# Patient Record
Sex: FEMALE | Race: WHITE | Employment: FULL TIME | URBAN - METROPOLITAN AREA
[De-identification: names, ages, dates, MRNs, and addresses within clinical notes are randomized per-mention and may not be internally consistent; named-entity substitution may affect disease eponyms.]

---

## 2017-01-13 ENCOUNTER — OFFICE VISIT (OUTPATIENT)
Dept: FAMILY MEDICINE CLINIC | Age: 27
End: 2017-01-13

## 2017-01-13 VITALS
HEART RATE: 63 BPM | HEIGHT: 64 IN | SYSTOLIC BLOOD PRESSURE: 109 MMHG | WEIGHT: 159 LBS | BODY MASS INDEX: 27.14 KG/M2 | DIASTOLIC BLOOD PRESSURE: 73 MMHG

## 2017-01-13 DIAGNOSIS — L50.9 HIVES: ICD-10-CM

## 2017-01-13 PROCEDURE — 99213 OFFICE O/P EST LOW 20 MIN: CPT | Performed by: FAMILY MEDICINE

## 2017-01-13 RX ORDER — MONTELUKAST SODIUM 10 MG/1
10 TABLET ORAL DAILY
Qty: 90 TABLET | Refills: 3 | Status: SHIPPED | OUTPATIENT
Start: 2017-01-13 | End: 2018-02-02 | Stop reason: SDUPTHER

## 2017-01-13 RX ORDER — MONTELUKAST SODIUM 10 MG/1
10 TABLET ORAL DAILY
Qty: 30 TABLET | Refills: 5 | Status: SHIPPED | OUTPATIENT
Start: 2017-01-13 | End: 2017-01-13 | Stop reason: SDUPTHER

## 2017-09-06 ENCOUNTER — OFFICE VISIT (OUTPATIENT)
Dept: FAMILY MEDICINE CLINIC | Age: 27
End: 2017-09-06

## 2017-09-06 VITALS
BODY MASS INDEX: 26.98 KG/M2 | SYSTOLIC BLOOD PRESSURE: 122 MMHG | WEIGHT: 158 LBS | DIASTOLIC BLOOD PRESSURE: 85 MMHG | HEART RATE: 65 BPM | HEIGHT: 64 IN

## 2017-09-06 DIAGNOSIS — Z00.00 WELL ADULT EXAM: Primary | ICD-10-CM

## 2017-09-06 DIAGNOSIS — Z11.1 SCREENING-PULMONARY TB: ICD-10-CM

## 2017-09-06 PROCEDURE — 99395 PREV VISIT EST AGE 18-39: CPT | Performed by: FAMILY MEDICINE

## 2017-09-06 PROCEDURE — 86580 TB INTRADERMAL TEST: CPT | Performed by: FAMILY MEDICINE

## 2017-09-08 ENCOUNTER — NURSE ONLY (OUTPATIENT)
Dept: FAMILY MEDICINE CLINIC | Age: 27
End: 2017-09-08

## 2017-09-08 DIAGNOSIS — Z11.1 ENCOUNTER FOR PPD SKIN TEST READING: Primary | ICD-10-CM

## 2017-09-08 LAB
INDURATION: NORMAL
TB SKIN TEST: NEGATIVE

## 2017-10-05 ENCOUNTER — TELEPHONE (OUTPATIENT)
Dept: FAMILY MEDICINE CLINIC | Age: 27
End: 2017-10-05

## 2017-10-05 DIAGNOSIS — Z11.59 SCREENING FOR VIRAL DISEASE: Primary | ICD-10-CM

## 2017-10-06 ENCOUNTER — NURSE ONLY (OUTPATIENT)
Dept: FAMILY MEDICINE CLINIC | Age: 27
End: 2017-10-06

## 2017-10-06 DIAGNOSIS — Z11.59 SCREENING FOR VIRAL DISEASE: ICD-10-CM

## 2017-10-08 LAB — VZV IGG SER QL IA: 354 IV

## 2018-02-02 DIAGNOSIS — L50.9 HIVES: ICD-10-CM

## 2018-02-02 RX ORDER — MONTELUKAST SODIUM 10 MG/1
10 TABLET ORAL DAILY
Qty: 90 TABLET | Refills: 3 | Status: SHIPPED | OUTPATIENT
Start: 2018-02-02 | End: 2018-02-05 | Stop reason: SDUPTHER

## 2018-02-05 RX ORDER — MONTELUKAST SODIUM 10 MG/1
10 TABLET ORAL DAILY
Qty: 90 TABLET | Refills: 3 | Status: SHIPPED | OUTPATIENT
Start: 2018-02-05 | End: 2020-02-19 | Stop reason: ALTCHOICE

## 2018-02-05 NOTE — TELEPHONE ENCOUNTER
[id number] [employer] called wanting to know if refill for pt's singulair was requested. They were unable to see if it was or not.

## 2018-02-09 ENCOUNTER — OFFICE VISIT (OUTPATIENT)
Dept: FAMILY MEDICINE CLINIC | Age: 28
End: 2018-02-09

## 2018-02-09 VITALS
RESPIRATION RATE: 16 BRPM | HEIGHT: 64 IN | BODY MASS INDEX: 25.78 KG/M2 | WEIGHT: 151 LBS | HEART RATE: 67 BPM | DIASTOLIC BLOOD PRESSURE: 85 MMHG | SYSTOLIC BLOOD PRESSURE: 131 MMHG

## 2018-02-09 DIAGNOSIS — F32.9 REACTIVE DEPRESSION: Primary | ICD-10-CM

## 2018-02-09 PROCEDURE — 99213 OFFICE O/P EST LOW 20 MIN: CPT | Performed by: FAMILY MEDICINE

## 2018-02-09 PROCEDURE — G0444 DEPRESSION SCREEN ANNUAL: HCPCS | Performed by: FAMILY MEDICINE

## 2018-02-09 RX ORDER — VENLAFAXINE HYDROCHLORIDE 37.5 MG/1
37.5 CAPSULE, EXTENDED RELEASE ORAL DAILY
Qty: 30 CAPSULE | Refills: 3 | Status: SHIPPED | OUTPATIENT
Start: 2018-02-09 | End: 2018-06-14 | Stop reason: SDUPTHER

## 2018-02-09 ASSESSMENT — PATIENT HEALTH QUESTIONNAIRE - PHQ9
9. THOUGHTS THAT YOU WOULD BE BETTER OFF DEAD, OR OF HURTING YOURSELF: 0
10. IF YOU CHECKED OFF ANY PROBLEMS, HOW DIFFICULT HAVE THESE PROBLEMS MADE IT FOR YOU TO DO YOUR WORK, TAKE CARE OF THINGS AT HOME, OR GET ALONG WITH OTHER PEOPLE: 1
1. LITTLE INTEREST OR PLEASURE IN DOING THINGS: 3
6. FEELING BAD ABOUT YOURSELF - OR THAT YOU ARE A FAILURE OR HAVE LET YOURSELF OR YOUR FAMILY DOWN: 2
5. POOR APPETITE OR OVEREATING: 0
8. MOVING OR SPEAKING SO SLOWLY THAT OTHER PEOPLE COULD HAVE NOTICED. OR THE OPPOSITE, BEING SO FIGETY OR RESTLESS THAT YOU HAVE BEEN MOVING AROUND A LOT MORE THAN USUAL: 2
4. FEELING TIRED OR HAVING LITTLE ENERGY: 1
3. TROUBLE FALLING OR STAYING ASLEEP: 1
SUM OF ALL RESPONSES TO PHQ QUESTIONS 1-9: 14
SUM OF ALL RESPONSES TO PHQ9 QUESTIONS 1 & 2: 6
2. FEELING DOWN, DEPRESSED OR HOPELESS: 3
7. TROUBLE CONCENTRATING ON THINGS, SUCH AS READING THE NEWSPAPER OR WATCHING TELEVISION: 2

## 2018-02-09 NOTE — Clinical Note
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 They are financially strapped and I suggested she get counseling and her first question was: How much does that cost? I don't know what you can do to help, but whatever you can would be appreciated.  Fran Temple

## 2018-02-09 NOTE — PATIENT INSTRUCTIONS
His Needs Her Needs  By Yoseph Seay His Needs Her Needs - [email or web address]  Ad · www.amazon.com/popular/items     Read Customer Reviews & Find Best [place]. Free 2-Day Shipping w/Amazon [employer].   Gifts for anyone · Shop our Deal of the Day · Shop Our Huge Selection

## 2018-03-09 ENCOUNTER — OFFICE VISIT (OUTPATIENT)
Dept: FAMILY MEDICINE CLINIC | Age: 28
End: 2018-03-09

## 2018-03-09 VITALS
HEIGHT: 64 IN | BODY MASS INDEX: 25.78 KG/M2 | SYSTOLIC BLOOD PRESSURE: 128 MMHG | WEIGHT: 151 LBS | DIASTOLIC BLOOD PRESSURE: 85 MMHG | HEART RATE: 71 BPM

## 2018-03-09 DIAGNOSIS — F32.9 REACTIVE DEPRESSION: Primary | ICD-10-CM

## 2018-03-09 PROCEDURE — 99213 OFFICE O/P EST LOW 20 MIN: CPT | Performed by: FAMILY MEDICINE

## 2018-03-09 RX ORDER — LEVONORGESTREL/ETHINYL ESTRADIOL AND ETHINYL ESTRADIOL 100-20(84)
KIT ORAL
Refills: 0 | Status: ON HOLD | COMMUNITY
Start: 2018-03-05 | End: 2022-08-26 | Stop reason: HOSPADM

## 2018-03-09 ASSESSMENT — ENCOUNTER SYMPTOMS
VOMITING: 0
GASTROINTESTINAL NEGATIVE: 1
ABDOMINAL PAIN: 0
SHORTNESS OF BREATH: 0
DIARRHEA: 0
COUGH: 0
NAUSEA: 0
CONSTIPATION: 0
RESPIRATORY NEGATIVE: 1

## 2018-03-09 NOTE — PROGRESS NOTES
Chief Complaint   Patient presents with    Depression         HPI:  Mark Lemos is a 28 y.o. (: 1990) here today   for f/u on depression. .      She is compliant with her  medication for depression. She reports these have been effective  Currently she has states that her symptoms have gotten a lot better. These are not affecting her ability to function at work or and at home. They are not having side effects of sedation, , sexual dysfunction, , nausea, , weight gain,  and  weight loss. Review of Systems   Constitutional: Negative. Negative for chills and fever. Respiratory: Negative. Negative for cough and shortness of breath. Cardiovascular: Negative. Negative for chest pain and palpitations. Gastrointestinal: Negative. Negative for abdominal pain, constipation, diarrhea, nausea and vomiting. Endocrine: Negative. Negative for polyuria. Genitourinary: Negative. Negative for dysuria, frequency and urgency.        Past Medical History:   Diagnosis Date    Chronic idiopathic urticaria     Depressive disorder, not elsewhere classified     Headache(784.0)     Migraine, unspecified, without mention of intractable migraine without mention of status migrainosus      Family History   Problem Relation Age of Onset    No Known Problems Mother     Other Father      Pulmonary Embolism    Other Sister      Addiciont     Social History     Social History    Marital status:      Spouse name: Nara Kendall    Number of children: 2    Years of education: N/A     Occupational History    Nursing- [employer]      Social History Main Topics    Smoking status: Former Smoker    Smokeless tobacco: Never Used    Alcohol use Yes      Comment: occasionally    Drug use: Unknown    Sexual activity: Not on file     Other Topics Concern    Not on file     Social History Narrative    No narrative on file       New Prescriptions    No medications on file         Meds Prior to visit:  Prior to

## 2018-03-21 ENCOUNTER — OFFICE VISIT (OUTPATIENT)
Dept: PSYCHOLOGY | Age: 28
End: 2018-03-21

## 2018-03-21 DIAGNOSIS — F32.9 REACTIVE DEPRESSION: Primary | ICD-10-CM

## 2018-03-21 PROCEDURE — 90791 PSYCH DIAGNOSTIC EVALUATION: CPT | Performed by: PSYCHOLOGIST

## 2018-03-21 ASSESSMENT — ANXIETY QUESTIONNAIRES
7. FEELING AFRAID AS IF SOMETHING AWFUL MIGHT HAPPEN: 0-NOT AT ALL SURE
3. WORRYING TOO MUCH ABOUT DIFFERENT THINGS: 1-SEVERAL DAYS
GAD7 TOTAL SCORE: 7
1. FEELING NERVOUS, ANXIOUS, OR ON EDGE: 1-SEVERAL DAYS
5. BEING SO RESTLESS THAT IT IS HARD TO SIT STILL: 1-SEVERAL DAYS
6. BECOMING EASILY ANNOYED OR IRRITABLE: 2-OVER HALF THE DAYS
4. TROUBLE RELAXING: 1-SEVERAL DAYS
2. NOT BEING ABLE TO STOP OR CONTROL WORRYING: 1-SEVERAL DAYS

## 2018-03-21 ASSESSMENT — PATIENT HEALTH QUESTIONNAIRE - PHQ9
1. LITTLE INTEREST OR PLEASURE IN DOING THINGS: 1
SUM OF ALL RESPONSES TO PHQ QUESTIONS 1-9: 2
SUM OF ALL RESPONSES TO PHQ9 QUESTIONS 1 & 2: 2
2. FEELING DOWN, DEPRESSED OR HOPELESS: 1

## 2018-03-21 NOTE — PROGRESS NOTES
Behavioral Health Consultation  Kristal June PsyD  Psychologist  3/21/2018  4:04 PM      Time spent with Patient: 50 minutes  This is patient's first  [facility] appointment. Reason for Consult:  Reactive depression  Referring Provider: Hiral Dickson MD  [address] [place] [address]    Pt provided informed consent for the behavioral health program. Discussed with patient model of service to include the limits of confidentiality (i.e. abuse reporting, suicide intervention, etc.) and short-term intervention focused approach. Pt indicated understanding. Feedback given to PCP. S:    Presenting Problem (PP): reactive depression    Problem hx: marital issues, been together 10 years, dated right after HS, 1-2 months got pregnant, stayed together, been  4 years, last 2 years been livign separately,    NP clinicals added to marital issues, full time [employer]     2 children: 5years old, 1years old     's alcohol problem: was drinking 12 beers and 1/3 bottle of run, down to 10 beer per night, pt unsure if he views this as a problem, feels he is cutting back because he is worried that she is going to leave him    Trauma hx:     Past psych tx: HS, sertraline, psychotherapy, every 2 weeks, 4 or 5 months, sophomore or osvaldo year, hard time expressing feelings, parents  at the time     Current psych med tx: What makes problem Better/Worse: Other problems/stressors:      Habits and health bx:   ETOH:  Tobacco:  Drugs:  Caffeine:    Functional assessment/Typical day (how PP is affecting or being affected by. Ameya Stone ):  Close relationships:    Recreation:   Physical activity:  Sleep:    Work:     Psych ROS:      Depression:      Linh: DENIES insomnia with increased energy, rapid speech, easily distracted or decreased attention, irritability, racing thoughts, expansive mood, increase in energy and goal directed behavior, grandiosity, flight of Other Topics Concern    Not on file     Social History Narrative    No narrative on file       TOBACCO:   reports that she has quit smoking. She has never used smokeless tobacco.  ETOH:   reports that she drinks alcohol. Family History:   Family History   Problem Relation Age of Onset    No Known Problems Mother     Other Father      Pulmonary Embolism    Other Sister      Addiciont         A:    Pt presenting with mixed anxiety and depressed mood in the adjustment disorder spectrum. Current stress seems to be situational in nature: severe marital issues,  has chronic alcohol problem for the last 10 years. Since pt has reached out to PCP for help with medication and she has shared this with , he is trying to cut back, down from 12 beers and 1/3 bottle of rum to 10 beers tonight. Pt is happy about this, but deeper discussion revealed that she is struggling with chronic and severe resentment about his drinking over the years. Recommended couple therapy to address marital issue but pt unsure if she wants to work on marriage. Gave her referral to [facility] for couple therapy, she will call them to find out availability but we agreed after much discussion another individual consult is needed before she talks to  about pursing treatment. Really encouraged her to take her time with this decision, medication is helping(see PHQ-2 and SHANTEL-7 scores below) and so we want to give her time to rest and recover from the stress of marriage. She was in agreement with this plan for now. F/u with me in 3 weeks, will continue to discuss readiness for couple consult and/or referral to couple therapy directly. Denied any si/hi risk, intent, or plan. No hx psych admissions. No hx suicide attempts/gestures. No hx substance about tx.      PHQ Scores 3/21/2018 2/9/2018   PHQ2 Score 2 6   PHQ9 Score 2 14     Interpretation of Total Score Depression Severity: 1-4 = Minimal depression, 5-9 = Mild depression, 10-14 = Moderate depression, 15-19 = Moderately severe depression, 20-27 = Severe depression    Trouble sleeping and feeling tried. SHANTEL 7 SCORE 3/21/2018   SHANTEL-7 Total Score 7     Interpretation of SHANTEL-7 score: 5-9 = mild anxiety, 10-14 = moderate anxiety, 15+ = severe anxiety. Recommend referral to behavioral health for scores 10 or greater. Safety:   Risk factors:   Protective factors:  does not have lethal plan, does not have access to guns or weapons, patient is darwin for safety, no family h/o suicide, no active psychosis or cognitive dysfunction, compliant with recommended medications,  and patient is future oriented    Diagnosis:    Adjustment disorder with mixed anxiety and depressed mood      Diagnosis Date    Chronic idiopathic urticaria     Depressive disorder, not elsewhere classified     Headache(784.0)     Migraine, unspecified, without mention of intractable migraine without mention of status migrainosus      Problems with primary support group      Plan:  Pt interventions:    Discussed self-care (sleep, nutrition, rewarding activities, social support, exercise), Discussed benefits of referral for specialty care, Discussed and problem-solved barriers in adhering to behavioral change plan, Discussed potential barriers to change, Established rapport, Conducted functional assessment, Supportive techniques and Provided Psychoeducation re: benefits of couple therapy. Pt Behavioral Change Plan:    1. Consider Psych [facility] for individual and/or couple therapy referral: In [place]: [phone]  2. Continue with venlafaxine 37.5 mg, return to clinic for Dr. Genevie Meckel in 3 months  3.  Return to clinic for Dr. Sindi Jacob in 3 weeks on 4/11 at 2:30 pm

## 2018-03-22 DIAGNOSIS — Z83.2 FAMILY HISTORY OF PROTEIN S DEFICIENCY: Primary | ICD-10-CM

## 2018-03-22 DIAGNOSIS — M54.2 NECK PAIN: Primary | ICD-10-CM

## 2018-03-26 ENCOUNTER — HOSPITAL ENCOUNTER (OUTPATIENT)
Dept: PHYSICAL THERAPY | Age: 28
Discharge: HOME | End: 2018-03-31
Admitting: ORTHOPAEDIC SURGERY

## 2018-03-27 NOTE — PLAN OF CARE
function, and ADLs as indicated by Functional Deficits. Long Term Goals: To be achieved in: 4 weeks  1. Disability index score of 70% or more on FOTO to assist with reaching prior level of function. 2. Patient will demonstrate increased AROM to [facility] of cervical/thoracic spine to allow for proper joint functioning as indicated by patients Functional Deficits. 3. Patient will demonstrate an increase in postural awareness and control and activation of  Deep cervical stabilizers to allow for proper functional mobility as indicated by patients Functional Deficits. 4. Patient will return to reading for 2 hours functional activities without increased symptoms or restriction.    5. No pain with driving and looking to left(patient specific functional goal)       Electronically signed by:  Rodriguez Bro PT

## 2018-03-27 NOTE — FLOWSHEET NOTE
manip      Rib mobilizations      STM      DN            NMR re-education      T-spine Ext- foam roll      Chin tucks                                   Therapeutic Exercise and NMR EXR  [x] (51565) Provided verbal/tactile cueing for activities related to strengthening, flexibility, endurance, ROM  for improvements in cervical, postural, scapular, scapulothoracic and UE control with self care, reaching, carrying, lifting, house/yardwork, driving/computer work.    [] (81709) Provided verbal/tactile cueing for activities related to improving balance, coordination, kinesthetic sense, posture, motor skill, proprioception  to assist with cervical, scapular, scapulothoracic and UE control with self care, reaching, carrying, lifting, house/yardwork, driving/computer work. Therapeutic Activities:    [x] (26251 or 38607) Provided verbal/tactile cueing for activities related to improving balance, coordination, kinesthetic sense, posture, motor skill, proprioception and motor activation to allow for proper function of cervical, scapular, scapulothoracic and UE control with self care, carrying, lifting, driving/computer work.      Home Exercise Program:    [x] (99445) Reviewed/Progressed HEP activities related to strengthening, flexibility, endurance, ROM of cervical, scapular, scapulothoracic and UE control with self care, reaching, carrying, lifting, house/yardwork, driving/computer work  [] (49189) Reviewed/Progressed HEP activities related to improving balance, coordination, kinesthetic sense, posture, motor skill, proprioception of cervical, scapular, scapulothoracic and UE control with self care, reaching, carrying, lifting, house/yardwork, driving/computer work      Manual Treatments:  PROM / STM / Oscillations-Mobs:  G-I, II, III, IV (PA's, Inf., Post.)  [x] (50563) Provided manual therapy to mobilize soft tissue/joints of cervical/CT, scapular GHJ and UE for the purpose of decreasing headache, modulating pain, is slowed due to complexities listed. [] Progression has been slowed due to co-morbidities.   [x] Plan just implemented, too soon to assess goals progression  [] Other:     ASSESSMENT:  See eval    Treatment/Activity Tolerance:  [x] Patient tolerated treatment well [] Patient limited by fatique  [] Patient limited by pain  [] Patient limited by other medical complications  [] Other:     Prognosis: [x] Good [] Fair  [] Poor    Patient Requires Follow-up: [x] Yes  [] No    PLAN: See eval  [] Continue per plan of care [] Alter current plan (see comments)  [x] Plan of care initiated [] Hold pending MD visit [] Discharge    Electronically signed by: Rodriguez Bro PT

## 2018-04-01 ENCOUNTER — HOSPITAL ENCOUNTER (OUTPATIENT)
Dept: PHYSICAL THERAPY | Age: 28
Discharge: HOME | End: 2018-04-30
Attending: ORTHOPAEDIC SURGERY | Admitting: ORTHOPAEDIC SURGERY

## 2018-04-11 ENCOUNTER — OFFICE VISIT (OUTPATIENT)
Dept: PSYCHOLOGY | Age: 28
End: 2018-04-11

## 2018-04-11 DIAGNOSIS — F32.9 REACTIVE DEPRESSION: Primary | ICD-10-CM

## 2018-04-11 PROCEDURE — 90832 PSYTX W PT 30 MINUTES: CPT | Performed by: PSYCHOLOGIST

## 2018-04-11 ASSESSMENT — PATIENT HEALTH QUESTIONNAIRE - PHQ9
2. FEELING DOWN, DEPRESSED OR HOPELESS: 1
SUM OF ALL RESPONSES TO PHQ QUESTIONS 1-9: 2
1. LITTLE INTEREST OR PLEASURE IN DOING THINGS: 1
SUM OF ALL RESPONSES TO PHQ9 QUESTIONS 1 & 2: 2

## 2018-06-14 ENCOUNTER — TELEPHONE (OUTPATIENT)
Dept: FAMILY MEDICINE CLINIC | Age: 28
End: 2018-06-14

## 2018-06-14 DIAGNOSIS — F32.9 REACTIVE DEPRESSION: ICD-10-CM

## 2018-06-14 RX ORDER — VENLAFAXINE HYDROCHLORIDE 37.5 MG/1
37.5 CAPSULE, EXTENDED RELEASE ORAL DAILY
Qty: 90 CAPSULE | Refills: 3 | Status: SHIPPED | OUTPATIENT
Start: 2018-06-14 | End: 2020-02-19 | Stop reason: ALTCHOICE

## 2018-06-22 ENCOUNTER — OFFICE VISIT (OUTPATIENT)
Dept: FAMILY MEDICINE CLINIC | Age: 28
End: 2018-06-22

## 2018-06-22 VITALS
SYSTOLIC BLOOD PRESSURE: 108 MMHG | WEIGHT: 161 LBS | HEART RATE: 71 BPM | HEIGHT: 65 IN | DIASTOLIC BLOOD PRESSURE: 72 MMHG | BODY MASS INDEX: 26.82 KG/M2

## 2018-06-22 DIAGNOSIS — Z13.220 SCREENING CHOLESTEROL LEVEL: ICD-10-CM

## 2018-06-22 DIAGNOSIS — G43.809 OTHER MIGRAINE WITHOUT STATUS MIGRAINOSUS, NOT INTRACTABLE: ICD-10-CM

## 2018-06-22 DIAGNOSIS — Z00.00 WELL ADULT EXAM: Primary | ICD-10-CM

## 2018-06-22 LAB
A/G RATIO: 1.6 (ref 1.1–2.2)
ALBUMIN SERPL-MCNC: 4.2 G/DL (ref 3.4–5)
ALP BLD-CCNC: 39 U/L (ref 40–129)
ALT SERPL-CCNC: 21 U/L (ref 10–40)
ANION GAP SERPL CALCULATED.3IONS-SCNC: 19 MMOL/L (ref 3–16)
AST SERPL-CCNC: 37 U/L (ref 15–37)
BILIRUB SERPL-MCNC: 0.5 MG/DL (ref 0–1)
BUN BLDV-MCNC: 14 MG/DL (ref 7–20)
CALCIUM SERPL-MCNC: 9 MG/DL (ref 8.3–10.6)
CHLORIDE BLD-SCNC: 102 MMOL/L (ref 99–110)
CHOLESTEROL, TOTAL: 205 MG/DL (ref 0–199)
CO2: 21 MMOL/L (ref 21–32)
CREAT SERPL-MCNC: 0.6 MG/DL (ref 0.6–1.1)
GFR AFRICAN AMERICAN: >60
GFR NON-AFRICAN AMERICAN: >60
GLOBULIN: 2.6 G/DL
GLUCOSE BLD-MCNC: 88 MG/DL (ref 70–99)
HDLC SERPL-MCNC: 83 MG/DL (ref 40–60)
LDL CHOLESTEROL CALCULATED: 98 MG/DL
POTASSIUM SERPL-SCNC: 4.4 MMOL/L (ref 3.5–5.1)
SODIUM BLD-SCNC: 142 MMOL/L (ref 136–145)
TOTAL PROTEIN: 6.8 G/DL (ref 6.4–8.2)
TRIGL SERPL-MCNC: 121 MG/DL (ref 0–150)
VLDLC SERPL CALC-MCNC: 24 MG/DL

## 2018-06-22 PROCEDURE — 99395 PREV VISIT EST AGE 18-39: CPT | Performed by: FAMILY MEDICINE

## 2018-06-22 PROCEDURE — 36415 COLL VENOUS BLD VENIPUNCTURE: CPT | Performed by: FAMILY MEDICINE

## 2018-06-22 RX ORDER — SUMATRIPTAN 100 MG/1
TABLET, FILM COATED ORAL
Qty: 9 TABLET | Refills: 0 | Status: SHIPPED | OUTPATIENT
Start: 2018-06-22 | End: 2018-09-11 | Stop reason: SDUPTHER

## 2018-06-22 ASSESSMENT — ENCOUNTER SYMPTOMS
SHORTNESS OF BREATH: 0
ABDOMINAL PAIN: 0
COUGH: 0
CONSTIPATION: 0
SORE THROAT: 0
NAUSEA: 0
EYE PAIN: 0
RHINORRHEA: 0
DIARRHEA: 0
COLOR CHANGE: 0
VOMITING: 0

## 2018-09-11 DIAGNOSIS — G43.809 OTHER MIGRAINE WITHOUT STATUS MIGRAINOSUS, NOT INTRACTABLE: ICD-10-CM

## 2018-09-11 RX ORDER — SUMATRIPTAN 100 MG/1
TABLET, FILM COATED ORAL
Qty: 9 TABLET | Refills: 3 | Status: SHIPPED | OUTPATIENT
Start: 2018-09-11 | End: 2018-10-15 | Stop reason: SDUPTHER

## 2018-10-15 DIAGNOSIS — G43.809 OTHER MIGRAINE WITHOUT STATUS MIGRAINOSUS, NOT INTRACTABLE: ICD-10-CM

## 2018-10-15 RX ORDER — SUMATRIPTAN 100 MG/1
TABLET, FILM COATED ORAL
Qty: 9 TABLET | Refills: 0 | Status: SHIPPED | OUTPATIENT
Start: 2018-10-15 | End: 2018-12-18 | Stop reason: SDUPTHER

## 2018-11-02 ENCOUNTER — OFFICE VISIT (OUTPATIENT)
Dept: FAMILY MEDICINE CLINIC | Age: 28
End: 2018-11-02
Payer: COMMERCIAL

## 2018-11-02 VITALS
WEIGHT: 162 LBS | DIASTOLIC BLOOD PRESSURE: 84 MMHG | HEART RATE: 81 BPM | BODY MASS INDEX: 26.99 KG/M2 | SYSTOLIC BLOOD PRESSURE: 120 MMHG | HEIGHT: 65 IN

## 2018-11-02 DIAGNOSIS — M54.32 SCIATICA OF LEFT SIDE: Primary | ICD-10-CM

## 2018-11-02 PROCEDURE — 99213 OFFICE O/P EST LOW 20 MIN: CPT | Performed by: FAMILY MEDICINE

## 2018-11-02 RX ORDER — METHYLPREDNISOLONE 4 MG/1
TABLET ORAL
Qty: 1 KIT | Refills: 0 | Status: SHIPPED | OUTPATIENT
Start: 2018-11-02 | End: 2020-02-19 | Stop reason: ALTCHOICE

## 2018-11-02 RX ORDER — CYCLOBENZAPRINE HCL 10 MG
10 TABLET ORAL 3 TIMES DAILY PRN
Qty: 20 TABLET | Refills: 0 | Status: SHIPPED | OUTPATIENT
Start: 2018-11-02 | End: 2018-11-12

## 2018-11-02 ASSESSMENT — ENCOUNTER SYMPTOMS
DIARRHEA: 0
BACK PAIN: 1
VOMITING: 0
SHORTNESS OF BREATH: 0
COUGH: 0
ABDOMINAL PAIN: 0
CONSTIPATION: 0
NAUSEA: 0

## 2018-11-02 NOTE — PROGRESS NOTES
normal. She has no wheezes. She has no rales. Abdominal: Soft. Bowel sounds are normal. She exhibits no distension and no mass. There is no tenderness. Neurological:   Straight Leg Raise: positive left   Deep Tendon reflexes:    Patellar 2+  bilaterally     Achilles  0  left 2+ Right  Babinski:    Not assessed   Motor:  Proximally  5 bilaterally               Distally 5 bilaterally      Skin: Skin is warm and dry. No rash noted. Microscopic Examination (no units)   Date Value   02/22/2017 YES     LDL Calculated (mg/dL)   Date Value   06/22/2018 98       ASSESSMENT/PLAN:    1. Sciatica of left side  Counseled likely has impinged nerve root. Will tx with meds and HEP and recheck 1month sooner if wors. - cyclobenzaprine (FLEXERIL) 10 MG tablet; Take 1 tablet by mouth 3 times daily as needed for Muscle spasms best if taken one hour before bedtime then Ice your lower back for 20 min  Dispense: 20 tablet; Refill: 0  - methylPREDNISolone (MEDROL DOSEPACK) 4 MG tablet; Take by mouth. Dispense: 1 kit; Refill: 0      RTC 1 month. Scribe attestation:  Yolanda De La O MA, am scribing for and in the presence of Albert Shetty MD. Electronically signed by Tamara Maxwell MA on 11/2/2018 at 10:21 AM            Provider attestation: Sean Starkey MD, personally performed the services scribed by the user listed above in my presence, and it is both accurate and complete. I agree with the ROS and Past Histories independently gathered by the clinical support staff and the remaining scribed note accurately describes my personal service to the patient.     [facility]    11/2/2018  10:32 AM

## 2018-12-18 DIAGNOSIS — G43.809 OTHER MIGRAINE WITHOUT STATUS MIGRAINOSUS, NOT INTRACTABLE: ICD-10-CM

## 2018-12-18 RX ORDER — SUMATRIPTAN 100 MG/1
TABLET, FILM COATED ORAL
Qty: 9 TABLET | Refills: 0 | Status: SHIPPED | OUTPATIENT
Start: 2018-12-18 | End: 2019-09-09 | Stop reason: SDUPTHER

## 2019-09-09 DIAGNOSIS — G43.809 OTHER MIGRAINE WITHOUT STATUS MIGRAINOSUS, NOT INTRACTABLE: ICD-10-CM

## 2019-09-09 RX ORDER — SUMATRIPTAN 100 MG/1
TABLET, FILM COATED ORAL
Qty: 9 TABLET | Refills: 0 | Status: SHIPPED | OUTPATIENT
Start: 2019-09-09 | End: 2020-03-02

## 2019-09-09 RX ORDER — ONDANSETRON 4 MG/1
4 TABLET, FILM COATED ORAL DAILY PRN
Qty: 30 TABLET | Refills: 0 | Status: SHIPPED | OUTPATIENT
Start: 2019-09-09 | End: 2020-03-02

## 2019-12-20 RX ORDER — BENZONATATE 200 MG/1
200 CAPSULE ORAL 3 TIMES DAILY PRN
Qty: 30 CAPSULE | Refills: 0 | Status: SHIPPED | OUTPATIENT
Start: 2019-12-20 | End: 2019-12-27

## 2020-02-18 NOTE — PROGRESS NOTES
Surgical Breast Oncology     CC: High Risk for Breast Cancer      Nic Atkinson is a self-referral  for a consultation for high risk breast.    HPI:  Nic Atkinson is a 26 y.o.  woman here for evaluation of her risk for breast cancer. Overall doing well and has no breast related concerns or changes in her recent health. She states that she does occassioanlly perform routine self breast evaluations but not monthly. She has not noticed any new abnormalities such as masses, skin changes, color changes,nipple discharge, or changes to the nipple-areolar complex. Her family cancer history is significant for ovarian and breast cancer in her maternal grandmother in addition to breast cancer in her paternal grandmother. She has not a breast biopsy in the past.  She has never had a mammogram or breast MRI. Had 8-9 gene genetic testing at her gynecologist office, reports results negative. Has two girls, 11 and 5yo. 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      Age first live birth 24  Breastfeeding Yes for 6 months   premenopausal   Oral contraceptives Yes  Hormone replacement No      Past Medical History:   Diagnosis Date    Chronic idiopathic urticaria     Depressive disorder, not elsewhere classified     Headache(784.0)     Migraine, unspecified, without mention of intractable migraine without mention of status migrainosus        Past Surgical History:   Procedure Laterality Date    MOUTH SURGERY      OTHER SURGICAL HISTORY      2 Vaginal births       Family History   Problem Relation Age of Onset    No Known Problems Mother     Other Father         Pulmonary Embolism    High Cholesterol Father     Other Sister         Addiciont    Breast Cancer Maternal Grandmother         66's    Ovarian Cancer Maternal Grandmother         63's    Breast Cancer Paternal Grandmother         early 66's     Family History Significant for Cancer: Paternal Grandmother, breast cancer (possibly bilateral?), dx 68,  80  Maternal Grandmother, breast cancer, dx 68, ovarian cancer dx62,  80    Allergies as of 2020    (No Known Allergies)       Social History     Tobacco Use    Smoking status: Former Smoker    Smokeless tobacco: Never Used   Substance Use Topics    Alcohol use: Yes     Comment: occasionally    Drug use: No         Current Outpatient Medications:     SUMAtriptan (IMITREX) 100 MG tablet, TAKE 1 TABLET BY MOUTH AT ONSET OF HEADACHE. MAY REPEAT DOSE IN 2 HOURS IF NO RELIEF. DO NOT EXCEED 2 TABLETS IN 24 HOURS, Disp: 9 tablet, Rfl: 0    AMETHIA LO 0.1-0.02 & 0.01 MG TABS, TK 1 T PO QD, Disp: , Rfl: 0    ranitidine (ZANTAC 75) 75 MG tablet, Take 75 mg by mouth 2 times daily, Disp: , Rfl:     cetirizine (ZYRTEC ALLERGY) 10 MG tablet, Take 1 tablet by mouth daily as needed for Allergies (hives), Disp: 30 tablet, Rfl: 5    ondansetron (ZOFRAN) 4 MG tablet, Take 1 tablet by mouth daily as needed for Nausea or Vomiting (Patient not taking: Reported on 2020), Disp: 30 tablet, Rfl: 0      Medications: documentation has been reviewed in the electronic medical record and patient office intake form. REVIEW OF SYSTEMS:  Constitutional: Negative for fever  HENT: Negative for sore throat  Eyes: Negative for redness   Respiratory: Negative for dyspnea, cough  Cardiovascular: Negative for chest pain  Gastrointestinal: Negative for vomiting, diarrhea   Genitourinary: Negative for hematuria   Musculoskeletal: Negative for arthralgias   Skin: Negative for rash  Neurological: Negative for syncope  Hematological: Negative for adenopathy  Psychiatric/Behavorial: Negative for anxiety    PHYSICAL EXAM:  /70   Pulse 70   Ht 5' 4.5\" (1.638 m)   Wt 162 lb (73.5 kg)   SpO2 98%   BMI 27.38 kg/m²   Constitutional: She appearswell-nourished. No apparent distress. Breast: The patient was examined in the upright and supine position. She has a \"D\" cup breast. Breasts are symmetrically ptotic. Right: No new masses or changes in breast contour. No skin changes of the breast or nipple areolar complex. No nipple inversion or discharge. No erythema, thickening (peau d'orange), or dimpling. Left: No new masses or changes in breast contour. No skin changes of the breast or nipple areolar complex. No nipple inversion or discharge. No erythema, thickening (peau d'orange), or dimpling. There is no axillary lymphadenopathy palpated bilaterally. Head: Normocephalic and atraumatic  Eyes: EOM are normal. Pupils are equal, round, and reactive to light. Neck: Neck supple. No tracheal deviation present. No obvious mass. Cardiovascular: regular rate. Pulmonary: No accessory muscle use. Respirations non-labored and no wheezing. Lymphatics: No palpable supraclavicular, cervical, or axillary lymphadenopathy  Skin: No rash noted. No erythema. Neurologic: alert and oriented. Extremities: appear well perfused. No edema. No joint deformity         Risk assessment using CARI Breast Cancer Risk Evaluation Tool to evaluate her risk compared to the general population. Her ten year risk of breast cancer is 0.9% (general population average 0.5%). Her lifetime risk for breast cancer is 20.2% (general population average 3.3%). ASSESSMENT:  - High Risk for Breast Cancer based on increased risk profile for breast cancer. Sheila (CARI) 8.0 Model calculated risk at  - 20.2% today. - Screening Breast Examination   - Family History of Ovarian Cancer - maternal grandmother   - Family History of Breast Cancer - maternal grandmother and paternal grandmother     PLAN:    1. Surveillance: We discussed the NCCN guidelines for high risk surveillance.   This includes annual screening mammography beginning 10 years prior to youngest affected family member (but not before age 27), annual screening MRI beginning 10 years prior to youngest

## 2020-02-19 ENCOUNTER — OFFICE VISIT (OUTPATIENT)
Dept: SURGERY | Age: 30
End: 2020-02-19
Payer: COMMERCIAL

## 2020-02-19 VITALS
HEIGHT: 65 IN | SYSTOLIC BLOOD PRESSURE: 115 MMHG | DIASTOLIC BLOOD PRESSURE: 70 MMHG | HEART RATE: 70 BPM | OXYGEN SATURATION: 98 % | BODY MASS INDEX: 26.99 KG/M2 | WEIGHT: 162 LBS

## 2020-02-19 PROCEDURE — 99243 OFF/OP CNSLTJ NEW/EST LOW 30: CPT | Performed by: NURSE PRACTITIONER

## 2020-02-20 ENCOUNTER — HOSPITAL ENCOUNTER (OUTPATIENT)
Dept: MAMMOGRAPHY | Age: 30
Discharge: HOME | End: 2020-02-20
Payer: COMMERCIAL

## 2020-02-20 PROCEDURE — 77063 BREAST TOMOSYNTHESIS BI: CPT

## 2020-03-02 ENCOUNTER — OFFICE VISIT (OUTPATIENT)
Dept: INTERNAL MEDICINE CLINIC | Age: 30
End: 2020-03-02
Payer: COMMERCIAL

## 2020-03-02 VITALS
WEIGHT: 166 LBS | HEIGHT: 64 IN | HEART RATE: 73 BPM | DIASTOLIC BLOOD PRESSURE: 74 MMHG | BODY MASS INDEX: 28.34 KG/M2 | SYSTOLIC BLOOD PRESSURE: 122 MMHG

## 2020-03-02 PROCEDURE — 99385 PREV VISIT NEW AGE 18-39: CPT | Performed by: NURSE PRACTITIONER

## 2020-03-02 RX ORDER — FAMOTIDINE 10 MG
10 TABLET ORAL 2 TIMES DAILY
Status: ON HOLD | COMMUNITY
End: 2022-08-26 | Stop reason: HOSPADM

## 2020-03-02 ASSESSMENT — ENCOUNTER SYMPTOMS
WHEEZING: 0
COUGH: 0
CHEST TIGHTNESS: 0
SHORTNESS OF BREATH: 0

## 2020-03-02 NOTE — PROGRESS NOTES
3/2/2020    This is a 26 y.o. female   Chief Complaint   Patient presents with   Froylan Meyer     HPI     Patient is here to establish care. She needs to be well within blood work. 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 She sees GYN for her Pap and birth control. She is  with 2 children. In a new relationship. History of migraines - doing well on current medication. She denies any concerns today. She has a family history of breast and ovarian cancer - she is seeing breast specialist for imaging.       Past Medical History:   Diagnosis Date    Chronic idiopathic urticaria     Depressive disorder, not elsewhere classified     Headache(784.0)     Migraine, unspecified, without mention of intractable migraine without mention of status migrainosus      Past Surgical History:   Procedure Laterality Date    MOUTH SURGERY  2003    OTHER SURGICAL HISTORY  2008/2014    2 Vaginal births     Social History     Socioeconomic History    Marital status:      Spouse name: William Ta    Number of children: 2    Years of education: Not on file    Highest education level: Not on file   Occupational History    Occupation: [address] [place] [address],[address] Financial resource strain: Not on file    Food insecurity:     Worry: Not on file     Inability: Not on file   Zhui Xin needs:     Medical: Not on file     Non-medical: Not on file   Tobacco Use    Smoking status: Former Smoker    Smokeless tobacco: Never Used   Substance and Sexual Activity    Alcohol use: Yes     Comment: occasionally    Drug use: No    Sexual activity: Yes     Birth control/protection: Other-see comments   Lifestyle    Physical activity:     Days per week: Not on file     Minutes per session: Not on file    Stress: Not on file   Relationships    Social connections:     Talks on phone: Not on file     Gets together: Not on file     Attends Alevism service: Not on file Active member of club or organization: Not on file     Attends meetings of clubs or organizations: Not on file     Relationship status: Not on file    Intimate partner violence:     Fear of current or ex partner: Not on file     Emotionally abused: Not on file     Physically abused: Not on file     Forced sexual activity: Not on file   Other Topics Concern    Not on file   Social History Narrative    Not on file     Family History   Problem Relation Age of Onset    No Known Problems Mother     Other Father         Pulmonary Embolism    High Cholesterol Father     Other Sister         Addiciont    Breast Cancer Maternal Grandmother         66's    Ovarian Cancer Maternal Grandmother         63's    Breast Cancer Paternal Grandmother         early 66's     Current Outpatient Medications   Medication Sig Dispense Refill    Ubrogepant (UBRELVY) 50 MG TABS Take by mouth      famotidine (PEPCID AC) 10 MG tablet Take 10 mg by mouth 2 times daily      AMETHIA LO 0.1-0.02 & 0.01 MG TABS TK 1 T PO QD  0    cetirizine (ZYRTEC ALLERGY) 10 MG tablet Take 1 tablet by mouth daily as needed for Allergies (hives) 30 tablet 5     No current facility-administered medications for this visit.       No Known Allergies    Office Visit on 06/22/2018   Component Date Value Ref Range Status    Cholesterol, Total 06/22/2018 205* 0 - 199 mg/dL Final    Triglycerides 06/22/2018 121  0 - 150 mg/dL Final    HDL 06/22/2018 83* 40 - 60 mg/dL Final    LDL Calculated 06/22/2018 98  <100 mg/dL Final    VLDL Cholesterol Calculated 06/22/2018 24  Not Established mg/dL Final    Sodium 06/22/2018 142  136 - 145 mmol/L Final    Potassium 06/22/2018 4.4  3.5 - 5.1 mmol/L Final    Chloride 06/22/2018 102  99 - 110 mmol/L Final    CO2 06/22/2018 21  21 - 32 mmol/L Final    Anion Gap 06/22/2018 19* 3 - 16 Final    Glucose 06/22/2018 88  70 - 99 mg/dL Final    BUN 06/22/2018 14  7 - 20 mg/dL Final    CREATININE 06/22/2018 0.6  0.6 - 1.1 mg/dL Final    GFR Non- 06/22/2018 >60  >60 Final    Comment: >60 mL/min/1.73m2 EGFR, calc. for ages 25 and older using the  MDRD formula (not corrected for weight), is valid for stable  renal function.  GFR  06/22/2018 >60  >60 Final    Comment: Chronic Kidney Disease: less than 60 ml/min/1.73 sq.m. Kidney Failure: less than 15 ml/min/1.73 sq.m. Results valid for patients 18 years and older.  Calcium 06/22/2018 9.0  8.3 - 10.6 mg/dL Final    Total Protein 06/22/2018 6.8  6.4 - 8.2 g/dL Final    Alb 06/22/2018 4.2  3.4 - 5.0 g/dL Final    Albumin/Globulin Ratio 06/22/2018 1.6  1.1 - 2.2 Final    Total Bilirubin 06/22/2018 0.5  0.0 - 1.0 mg/dL Final    Alkaline Phosphatase 06/22/2018 39* 40 - 129 U/L Final    ALT 06/22/2018 21  10 - 40 U/L Final    AST 06/22/2018 37  15 - 37 U/L Final    Comment: Specimen hemolysis has exceeded the interference as defined by Roche. Value may be falsely increased. Suggest recollection if clinically  indicated.  Globulin 06/22/2018 2.6  g/dL Final     Review of Systems   Constitutional: Negative for chills, fatigue and fever. Respiratory: Negative for cough, chest tightness, shortness of breath and wheezing. Cardiovascular: Negative for chest pain, palpitations and leg swelling. Neurological: Negative for dizziness, tremors, light-headedness and headaches. /74   Pulse 73   Ht 5' 4\" (1.626 m)   Wt 166 lb (75.3 kg)   LMP 02/02/2020 (Exact Date)   BMI 28.49 kg/m²      Physical Exam  Vitals signs reviewed. Constitutional:       General: She is not in acute distress. Appearance: She is well-developed. She is not diaphoretic. HENT:      Head: Normocephalic and atraumatic. Neck:      Musculoskeletal: Normal range of motion and neck supple. Thyroid: No thyromegaly. Trachea: No tracheal deviation. Cardiovascular:      Rate and Rhythm: Normal rate and regular rhythm.       Heart sounds: Normal heart sounds. No murmur. Pulmonary:      Effort: Pulmonary effort is normal. No respiratory distress. Breath sounds: Normal breath sounds. No wheezing or rhonchi. Skin:     General: Skin is warm and dry. Neurological:      General: No focal deficit present. Mental Status: She is alert and oriented to person, place, and time. Deep Tendon Reflexes: Reflexes are normal and symmetric. Psychiatric:         Mood and Affect: Mood normal.         Behavior: Behavior normal.        Diagnosis  Assessment and Plan  1. Preventative health care  Overall patient is feeling well  Checking labs for B12 within  Had waist circumference  - Basic Metabolic Panel; Future  - Lipid Panel; Future  - Vitamin D 25 Hydroxy; Future  - TSH WITH REFLEX TO FT4; Future  - NICOTINE AND METABOLITES Serum/Plasma; Future    2. Other migraine without status migrainosus, not intractable  Stable, controlled on current regimen. 3. Family history of breast cancer  Recently had mammogram- wnl  Plan to have mammogram once a year and MRI once a year  Seeing breast specialist    4.  Family history of ovarian cancer  See #3    Follow-up yearly and as needed    Electronically signed by OMI Nicolas CNP on 3/2/2020 at 3:45 PM

## 2020-03-05 RX ORDER — ERGOCALCIFEROL 1.25 MG/1
50000 CAPSULE ORAL WEEKLY
Qty: 12 CAPSULE | Refills: 1 | Status: ON HOLD | OUTPATIENT
Start: 2020-03-05 | End: 2022-08-26 | Stop reason: HOSPADM

## 2020-05-20 RX ORDER — UBROGEPANT 50 MG/1
1 TABLET ORAL DAILY PRN
Qty: 30 TABLET | Refills: 1 | Status: SHIPPED | OUTPATIENT
Start: 2020-05-20 | End: 2021-03-01 | Stop reason: SDUPTHER

## 2020-06-01 ENCOUNTER — HOSPITAL ENCOUNTER (OUTPATIENT)
Dept: MRI IMAGING | Age: 30
Discharge: HOME | End: 2020-06-01
Payer: COMMERCIAL

## 2020-06-01 PROCEDURE — 6360000004 HC RX CONTRAST MEDICATION: Performed by: NURSE PRACTITIONER

## 2020-06-01 PROCEDURE — A9579 GAD-BASE MR CONTRAST NOS,1ML: HCPCS | Performed by: NURSE PRACTITIONER

## 2020-06-01 PROCEDURE — 77049 MRI BREAST C-+ W/CAD BI: CPT

## 2020-06-01 RX ADMIN — GADOTERIDOL 15 ML: 279.3 INJECTION, SOLUTION INTRAVENOUS at 15:30

## 2020-06-03 ENCOUNTER — TELEPHONE (OUTPATIENT)
Dept: INTERNAL MEDICINE CLINIC | Age: 30
End: 2020-06-03

## 2020-06-03 NOTE — TELEPHONE ENCOUNTER
I can send over imitrix if she needs it however pt does not tolerate it well. I thought we were doing an appeal? Do we know where we are in the appeal process?

## 2020-06-08 RX ORDER — SUMATRIPTAN 100 MG/1
TABLET, FILM COATED ORAL
Qty: 9 TABLET | Refills: 0 | Status: SHIPPED | OUTPATIENT
Start: 2020-06-08 | End: 2020-07-13

## 2020-07-06 ENCOUNTER — TELEPHONE (OUTPATIENT)
Dept: SURGERY | Age: 30
End: 2020-07-06

## 2020-07-06 NOTE — TELEPHONE ENCOUNTER
Left message on voice mail, rescheduled appointment from 08/18/20 to 08/17/20 due to Keo Vásquez not in office, Schedule has changed due to COVID-19.

## 2020-07-13 RX ORDER — SUMATRIPTAN 100 MG/1
TABLET, FILM COATED ORAL
Qty: 9 TABLET | Refills: 0 | Status: SHIPPED | OUTPATIENT
Start: 2020-07-13 | End: 2020-08-21

## 2020-07-27 RX ORDER — NITROFURANTOIN 25; 75 MG/1; MG/1
100 CAPSULE ORAL 2 TIMES DAILY
Qty: 14 CAPSULE | Refills: 0 | Status: SHIPPED | OUTPATIENT
Start: 2020-07-27 | End: 2020-08-03

## 2020-08-05 ENCOUNTER — E-VISIT (OUTPATIENT)
Dept: INTERNAL MEDICINE CLINIC | Age: 30
End: 2020-08-05
Payer: COMMERCIAL

## 2020-08-05 PROCEDURE — 99422 OL DIG E/M SVC 11-20 MIN: CPT | Performed by: NURSE PRACTITIONER

## 2020-08-06 RX ORDER — TOPIRAMATE 50 MG/1
100 TABLET, FILM COATED ORAL 2 TIMES DAILY
Qty: 360 TABLET | Refills: 1 | Status: SHIPPED | OUTPATIENT
Start: 2020-08-06 | End: 2020-09-14 | Stop reason: SDUPTHER

## 2020-08-06 NOTE — PROGRESS NOTES
Restarting topamax for better migraine control. Sent Rx to pharmacy. Follow up if not having good control.

## 2020-08-21 RX ORDER — SUMATRIPTAN 100 MG/1
TABLET, FILM COATED ORAL
Qty: 9 TABLET | Refills: 0 | Status: SHIPPED | OUTPATIENT
Start: 2020-08-21 | End: 2020-10-14 | Stop reason: SDUPTHER

## 2020-08-24 ENCOUNTER — OFFICE VISIT (OUTPATIENT)
Dept: SURGERY | Age: 30
End: 2020-08-24
Payer: COMMERCIAL

## 2020-08-24 VITALS
HEART RATE: 64 BPM | SYSTOLIC BLOOD PRESSURE: 112 MMHG | BODY MASS INDEX: 27.04 KG/M2 | HEIGHT: 64 IN | DIASTOLIC BLOOD PRESSURE: 72 MMHG | TEMPERATURE: 97.4 F | WEIGHT: 158.4 LBS

## 2020-08-24 PROCEDURE — 99213 OFFICE O/P EST LOW 20 MIN: CPT | Performed by: NURSE PRACTITIONER

## 2020-08-24 NOTE — PROGRESS NOTES
Surgical Breast Oncology     CC: High Risk for Breast Cancer      HPI:  Luis Travis is a 31 y.o.  woman here for routine follow up for high risk for breast cancer. Overall doing well and has no breast related concerns or changes in her recent health. She states that she does occassioanlly perform routine self breast evaluations but not monthly. She has not noticed any new abnormalities such as masses, skin changes, color changes, nipple discharge, or changes to the nipple-areolar complex. Her family cancer history is significant for ovarian and breast cancer in her maternal grandmother in addition to breast cancer in her paternal grandmother. She has not had a breast biopsy in the past.      Had 8-9 gene genetic testing at her gynecologist office, reports results negative. Has two girls, 11 and 7yo. 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 BI-RADS 1. Bilateral screening MRI 2020:  Negative MRI of the breast.  BI-RADS 1. Past Medical History:   Diagnosis Date    Chronic idiopathic urticaria     Depressive disorder, not elsewhere classified     Headache(784.0)     Migraine, unspecified, without mention of intractable migraine without mention of status migrainosus        Past Surgical History:   Procedure Laterality Date    MOUTH SURGERY      OTHER SURGICAL HISTORY      2 Vaginal births     Menstrual History:  Menarche age 5.      Age first live birth 24  Breastfeeding Yes for 6 months   premenopausal   Oral contraceptives Yes  Hormone replacement No     Family History   Problem Relation Age of Onset    No Known Problems Mother     Other Father         Pulmonary Embolism    High Cholesterol Father     Other Sister         Addiciont    Breast Cancer Maternal Grandmother         66's    Ovarian Cancer Maternal Grandmother Negative for anxiety    PHYSICAL EXAM:  /72 (Site: Left Upper Arm, Position: Sitting, Cuff Size: Medium Adult)   Pulse 64   Temp 97.4 °F (36.3 °C) (Tympanic)   Ht 5' 4\" (1.626 m)   Wt 158 lb 6.4 oz (71.8 kg)   BMI 27.19 kg/m²   Constitutional: She appearswell-nourished. No apparent distress. Breast: The patient was examined in the upright and supine position. She has a \"D\" cup breast. Breasts are symmetrically ptotic. Right: No new masses or changes in breast contour. No skin changes of the breast or nipple areolar complex. No nipple inversion or discharge. No erythema, thickening (peau d'orange), or dimpling. Left: No new masses or changes in breast contour. No skin changes of the breast or nipple areolar complex. No nipple inversion or discharge. No erythema, thickening (peau d'orange), or dimpling. There is no axillary lymphadenopathy palpated bilaterally. Head: Normocephalic and atraumatic  Eyes: EOM are normal. Pupils are equal, round, and reactive to light. Neck: Neck supple. No tracheal deviation present. No obvious mass. Cardiovascular: regular rate. Pulmonary: No accessory muscle use. Respirations non-labored and no wheezing. Lymphatics: No palpable supraclavicular, cervical, or axillary lymphadenopathy  Skin: No rash noted. No erythema. Neurologic: alert and oriented. Extremities: appear well perfused. No edema. No joint deformity         Risk assessment using CARI Breast Cancer Risk Evaluation Tool to evaluate her risk compared to the general population. Her ten year risk of breast cancer is 0.9% (general population average 0.5%). Her lifetime risk for breast cancer is 20.2% (general population average 3.3%). ASSESSMENT:  - High Risk for Breast Cancer based on increased risk profile for breast cancer. Tyrtommy-Desiree (CARI) 8.0 Model calculated risk at  - 20.2% today.     - Screening Breast Examination   - Family History of Ovarian Cancer - maternal grandmother   - Family History of Breast Cancer - maternal grandmother and paternal grandmother     PLAN:    1. Surveillance: We discussed the NCCN guidelines for high risk surveillance. This includes annual screening mammography beginning 10 years prior to youngest affected family member (but not before age [id number] Yordan Blake), annual screening MRI beginning 10 years prior to youngest affected family member (but not before age 22), and clinical breast exams every 6-12 months. We also stressed the importance of breast awareness. - Bilateral screening mammogram and clinical breast exam due: 2/2021   - Bilateral breast MRI and clinical breast exam due: 8/2021     2. Risk Reduction Surgery:  Has been briefly discussed in the past.      3. Medical Oncology: We discussed the role of chemoprophylaxis in women with an increased risk of breast cancer. At this time Ms. Jordyn Alva expresses that she is not interested in antiestrogen therapy. We will refer her to medical oncology should she wish to revisit this in the future. She will consider and we can revisit at next appt. 4. Requested to bring genetic testing results to office      5. Education provided for Healthy Lifestyle Recommendations: healthy diet, routine exercise, weight control, decreased alcohol consumption. She is exercising 3 days/week. OMI Ram[facility])   Surgical Breast Oncology   [phone]      All of the patient's questions were answered at this time however, she was encouraged to call the office with any further inquiries. Approximately 15 minutes of time were spent in this visit of which 50% or more of the time was related to coordination of care.

## 2020-09-04 ENCOUNTER — TELEPHONE (OUTPATIENT)
Dept: INTERNAL MEDICINE CLINIC | Age: 30
End: 2020-09-04

## 2020-09-04 RX ORDER — SULFAMETHOXAZOLE AND TRIMETHOPRIM 800; 160 MG/1; MG/1
1 TABLET ORAL 2 TIMES DAILY
Qty: 14 TABLET | Refills: 0 | Status: SHIPPED | OUTPATIENT
Start: 2020-09-04 | End: 2020-09-11

## 2020-09-14 RX ORDER — TOPIRAMATE 50 MG/1
100 TABLET, FILM COATED ORAL 2 TIMES DAILY
Qty: 360 TABLET | Refills: 1 | Status: SHIPPED | OUTPATIENT
Start: 2020-09-14 | End: 2021-10-25

## 2020-10-14 RX ORDER — SUMATRIPTAN 100 MG/1
TABLET, FILM COATED ORAL
Qty: 9 TABLET | Refills: 0 | Status: SHIPPED | OUTPATIENT
Start: 2020-10-14 | End: 2020-12-16 | Stop reason: SDUPTHER

## 2020-10-14 NOTE — TELEPHONE ENCOUNTER
[facility] is requesting a refill on Sumatriptan 100 mg tabs. Med pended.       Last OV 8/5/2020   Next OV Visit date not found  Last [facility] 8/21/2020

## 2020-11-16 ENCOUNTER — TELEPHONE (OUTPATIENT)
Dept: INTERNAL MEDICINE CLINIC | Age: 30
End: 2020-11-16

## 2020-12-16 RX ORDER — SUMATRIPTAN 100 MG/1
TABLET, FILM COATED ORAL
Qty: 9 TABLET | Refills: 0 | Status: ON HOLD | OUTPATIENT
Start: 2020-12-16 | End: 2022-08-26 | Stop reason: HOSPADM

## 2020-12-16 NOTE — TELEPHONE ENCOUNTER
[facility] is requesting a refill on SUMAtriptan (IMITREX) 100 MG tablet    Med pended.     Last OV 8/5/2020   Next OV Visit date not found  Last [facility] 10/14/2020

## 2021-03-01 RX ORDER — UBROGEPANT 50 MG/1
1 TABLET ORAL DAILY PRN
Qty: 30 TABLET | Refills: 1 | Status: SHIPPED | OUTPATIENT
Start: 2021-03-01 | End: 2021-07-23

## 2021-03-12 ENCOUNTER — HOSPITAL ENCOUNTER (OUTPATIENT)
Dept: MAMMOGRAPHY | Age: 31
Discharge: HOME | End: 2021-03-12
Payer: COMMERCIAL

## 2021-03-12 ENCOUNTER — OFFICE VISIT (OUTPATIENT)
Dept: SURGERY | Age: 31
End: 2021-03-12
Payer: COMMERCIAL

## 2021-03-12 VITALS
SYSTOLIC BLOOD PRESSURE: 112 MMHG | BODY MASS INDEX: 26.42 KG/M2 | RESPIRATION RATE: 18 BRPM | HEART RATE: 78 BPM | HEIGHT: 65 IN | OXYGEN SATURATION: 99 % | TEMPERATURE: 97.2 F | WEIGHT: 158.6 LBS | DIASTOLIC BLOOD PRESSURE: 70 MMHG

## 2021-03-12 DIAGNOSIS — R92.2 DENSE BREAST TISSUE ON MAMMOGRAM: ICD-10-CM

## 2021-03-12 DIAGNOSIS — Z12.39 ENCOUNTER FOR SCREENING BREAST EXAMINATION: ICD-10-CM

## 2021-03-12 DIAGNOSIS — Z12.31 VISIT FOR SCREENING MAMMOGRAM: ICD-10-CM

## 2021-03-12 DIAGNOSIS — Z91.89 AT HIGH RISK FOR BREAST CANCER: Primary | ICD-10-CM

## 2021-03-12 DIAGNOSIS — Z12.39 BREAST CANCER SCREENING, HIGH RISK PATIENT: ICD-10-CM

## 2021-03-12 DIAGNOSIS — Z80.3 FAMILY HISTORY OF BREAST CANCER: ICD-10-CM

## 2021-03-12 DIAGNOSIS — Z80.41 FAMILY HISTORY OF OVARIAN CANCER: ICD-10-CM

## 2021-03-12 PROCEDURE — 77067 SCR MAMMO BI INCL CAD: CPT

## 2021-03-12 PROCEDURE — 99213 OFFICE O/P EST LOW 20 MIN: CPT | Performed by: NURSE PRACTITIONER

## 2021-03-12 NOTE — PATIENT INSTRUCTIONS
Healthy Lifestyle Recommendations: healthy diet (decrease consumption of red meat, increase fresh fruits and vegetables), decreased alcohol consumption (less than 4 drinks/week), adequate sleep (goal 6-8 hours), routine exercise (goal 150 minutes/week or greater), weight control. Patient Education        Breast Self-Exam: Care Instructions  Your Care Instructions     A breast self-exam is when you check your breasts for lumps or changes. This regular exam helps you learn how your breasts normally look and feel. Most breast problems or changes are not because of cancer. Breast self-exam is not a substitute for a mammogram. Having regular breast exams by your doctor and regular mammograms improve your chances of finding any problems with your breasts. Some women set a time each month to do a step-by-step breast self-exam. Other women like a less formal system. They might look at their breasts as they brush their teeth, or feel their breasts once in a while in the shower. If you notice a change in your breast, tell your doctor. Follow-up care is a key part of your treatment and safety. Be sure to make and go to all appointments, and call your doctor if you are having problems. It's also a good idea to know your test results and keep a list of the medicines you take. How do you do a breast self-exam?  · The best time to examine your breasts is usually one week after your menstrual period begins. Your breasts should not be tender then. If you do not have periods, you might do your exam on a day of the month that is easy to remember. · To examine your breasts:  ? Remove all your clothes above the waist and lie down. When you are lying down, your breast tissue spreads evenly over your chest wall, which makes it easier to feel all your breast tissue. ?  Use the pads--not the fingertips--of the 3 middle fingers of your left hand to check your right breast. Move your fingers slowly in small coin-sized circles that overlap. ? Use three levels of pressure to feel of all your breast tissue. Use light pressure to feel the tissue close to the skin surface. Use medium pressure to feel a little deeper. Use firm pressure to feel your tissue close to your breastbone and ribs. Use each pressure level to feel your breast tissue before moving on to the next spot. ? Check your entire breast, moving up and down as if following a strip from the collarbone to the bra line, and from the armpit to the ribs. Repeat until you have covered the entire breast.  ? Repeat this procedure for your left breast, using the pads of the 3 middle fingers of your right hand. · To examine your breasts while in the shower:  ? Place one arm over your head and lightly soap your breast on that side. ? Using the pads of your fingers, gently move your hand over your breast (in the strip pattern described above), feeling carefully for any lumps or changes. ? Repeat for the other breast.  · Have your doctor inspect anything you notice to see if you need further testing. Where can you learn more? Go to https://[employer]Surveypal. org and sign in to your [employer] account. Enter P148 in the [employer] box to learn more about \"Breast Self-Exam: Care Instructions. \"     If you do not have an account, please click on the \"Sign Up Now\" link. Current as of: December 17, 2020               Content Version: 12.8  © 1836-8686 Healthwise, Incorporated. Care instructions adapted under license by [facility] ([facility]). If you have questions about a medical condition or this instruction, always ask your healthcare professional. [place] [address] any warranty or liability for your use of this information.

## 2021-07-23 RX ORDER — UBROGEPANT 50 MG/1
TABLET ORAL
Qty: 10 TABLET | Refills: 5 | Status: ON HOLD | OUTPATIENT
Start: 2021-07-23 | End: 2022-08-26 | Stop reason: HOSPADM

## 2021-08-05 LAB
CHOLESTEROL, TOTAL: 236 MG/DL (ref 0–199)
GLUCOSE BLD-MCNC: 82 MG/DL (ref 70–99)
HDLC SERPL-MCNC: 65 MG/DL (ref 40–60)
LDL CHOLESTEROL CALCULATED: 137 MG/DL
TRIGL SERPL-MCNC: 168 MG/DL (ref 0–150)

## 2021-08-07 ENCOUNTER — TELEPHONE (OUTPATIENT)
Dept: INTERNAL MEDICINE CLINIC | Age: 31
End: 2021-08-07

## 2021-08-07 RX ORDER — TRIAMCINOLONE ACETONIDE 0.25 MG/G
CREAM TOPICAL
Status: CANCELLED | OUTPATIENT
Start: 2021-08-07

## 2021-08-26 ENCOUNTER — OFFICE VISIT (OUTPATIENT)
Dept: INTERNAL MEDICINE CLINIC | Age: 31
End: 2021-08-26
Payer: COMMERCIAL

## 2021-08-26 VITALS
SYSTOLIC BLOOD PRESSURE: 116 MMHG | HEART RATE: 62 BPM | BODY MASS INDEX: 28.05 KG/M2 | OXYGEN SATURATION: 99 % | DIASTOLIC BLOOD PRESSURE: 80 MMHG | WEIGHT: 166 LBS

## 2021-08-26 DIAGNOSIS — G43.809 OTHER MIGRAINE WITHOUT STATUS MIGRAINOSUS, NOT INTRACTABLE: ICD-10-CM

## 2021-08-26 DIAGNOSIS — F90.0 ATTENTION DEFICIT HYPERACTIVITY DISORDER (ADHD), PREDOMINANTLY INATTENTIVE TYPE: Primary | ICD-10-CM

## 2021-08-26 PROCEDURE — 99214 OFFICE O/P EST MOD 30 MIN: CPT | Performed by: NURSE PRACTITIONER

## 2021-08-26 RX ORDER — DEXTROAMPHETAMINE SACCHARATE, AMPHETAMINE ASPARTATE MONOHYDRATE, DEXTROAMPHETAMINE SULFATE AND AMPHETAMINE SULFATE 3.75; 3.75; 3.75; 3.75 MG/1; MG/1; MG/1; MG/1
15 CAPSULE, EXTENDED RELEASE ORAL DAILY
Qty: 30 CAPSULE | Refills: 0 | Status: SHIPPED | OUTPATIENT
Start: 2021-08-26 | End: 2021-09-24 | Stop reason: SDUPTHER

## 2021-08-26 SDOH — ECONOMIC STABILITY: FOOD INSECURITY: WITHIN THE PAST 12 MONTHS, YOU WORRIED THAT YOUR FOOD WOULD RUN OUT BEFORE YOU GOT MONEY TO BUY MORE.: NEVER TRUE

## 2021-08-26 SDOH — ECONOMIC STABILITY: FOOD INSECURITY: WITHIN THE PAST 12 MONTHS, THE FOOD YOU BOUGHT JUST DIDN'T LAST AND YOU DIDN'T HAVE MONEY TO GET MORE.: NEVER TRUE

## 2021-08-26 ASSESSMENT — ENCOUNTER SYMPTOMS
CHEST TIGHTNESS: 0
WHEEZING: 0
SHORTNESS OF BREATH: 0
COUGH: 0

## 2021-08-26 ASSESSMENT — SOCIAL DETERMINANTS OF HEALTH (SDOH): HOW HARD IS IT FOR YOU TO PAY FOR THE VERY BASICS LIKE FOOD, HOUSING, MEDICAL CARE, AND HEATING?: NOT HARD AT ALL

## 2021-08-26 NOTE — PROGRESS NOTES
8/26/21     Chief Complaint   Patient presents with    ADHD     Would like to be treated, never dx. HPI     Here for follow up. Recently completed be well within labs. Migraines - moderately controlled  ubrelvy works better than imitrex   On topamax - not taking consistently     Concern for ADHD- having lots of trouble focusing   Cannot get anything done. She is unorganized  Getting worse worse. Has always had symptoms but was able to manage  Having difficulty with work  Sometimes compensates with caffeine   Does not feel it is related to any anxiety   Self assessment complete today - will scan into media    No Known Allergies    Current Outpatient Medications   Medication Sig Dispense Refill    amphetamine-dextroamphetamine (ADDERALL XR) 15 MG extended release capsule Take 1 capsule by mouth daily for 30 days. 30 capsule 0    UBRELVY 50 MG TABS TAKE 1 TABLET BY MOUTH AS NEEDED FOR MIGRAINE. MAY REPEAT IN 2 HOURS. 10 tablet 5    SUMAtriptan (IMITREX) 100 MG tablet TAKE 1 TABLET BY MOUTH AT ONSET OF HEADACHE. MAY REPEAT DOSE IN 2 HOURS IF NO RELIEF. DO NOT EXCEED 2 TABLETS IN 24 HOURS 9 tablet 0    topiramate (TOPAMAX) 50 MG tablet Take 2 tablets by mouth 2 times daily 360 tablet 1    vitamin D (ERGOCALCIFEROL) 1.25 MG (36385 UT) CAPS capsule Take 1 capsule by mouth once a week 12 capsule 1    famotidine (PEPCID AC) 10 MG tablet Take 10 mg by mouth 2 times daily      AMETHIA LO 0.1-0.02 & 0.01 MG TABS TK 1 T PO QD  0    cetirizine (ZYRTEC ALLERGY) 10 MG tablet Take 1 tablet by mouth daily as needed for Allergies (hives) 30 tablet 5     No current facility-administered medications for this visit. Review of Systems   Constitutional: Negative for chills, fatigue and fever. Respiratory: Negative for cough, chest tightness, shortness of breath and wheezing. Cardiovascular: Negative for chest pain, palpitations and leg swelling.    Neurological: Negative for dizziness, tremors, light-headedness

## 2021-09-19 NOTE — PROGRESS NOTES
Surgical Breast Oncology     CC: High Risk for Breast Cancer      HPI:  Dolores Pradhan is a 31 y.o.  woman here for routine follow up for high risk for breast cancer. Overall doing well and has no breast related concerns or changes in her recent health. Has had right lower outer quadrant pain, throbbing, occurred for one week on 2 occassions and then resolved; contributes it to her menstrual cycle. She states that she does occassioanlly perform routine self breast evaluations but not monthly. She has not noticed any new abnormalities such as masses, skin changes, color changes, nipple discharge, or changes to the nipple-areolar complex. Her family cancer history is significant for ovarian and breast cancer in her maternal grandmother in addition to breast cancer in her paternal grandmother. She has not had a breast biopsy in the past.      Had 8-9 gene genetic testing at her gynecologist office, reports results negative. Has two girls, 11 and 5yo. 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 BI-RADS 1. Bilateral screening MRI 2020:  Negative MRI of the breast.  BI-RADS 1. Bilateral screening mammogram 3/12/2021:  Breast stroma is heterogeneously dense. No new suspicious or concerning findings suggestive of malignancy. BI-RADS 2. Past Medical History:   Diagnosis Date    Chronic idiopathic urticaria     Depressive disorder, not elsewhere classified     Headache(784.0)     Migraine, unspecified, without mention of intractable migraine without mention of status migrainosus        Past Surgical History:   Procedure Laterality Date    MOUTH SURGERY      OTHER SURGICAL HISTORY      2 Vaginal births     Menstrual History:  Menarche age 5.      Age first live birth 24  Breastfeeding Yes for 6 months   premenopausal   Oral contraceptives dyspnea, cough  Cardiovascular: Negative for chest pain  Gastrointestinal: Negative for vomiting, diarrhea   Genitourinary: Negative for hematuria   Musculoskeletal: Negative for arthralgias   Skin: Negative for rash  Neurological: Negative for syncope  Hematological: Negative for adenopathy  Psychiatric/Behavorial: Negative for anxiety    PHYSICAL EXAM:  /70 (Site: Right Upper Arm, Position: Sitting, Cuff Size: Medium Adult)   Pulse 78   Temp 97.2 °F (36.2 °C) (Temporal)   Resp 18   Ht 5' 4.5\" (1.638 m)   Wt 158 lb 9.6 oz (71.9 kg)   SpO2 99%   BMI 26.80 kg/m²   Constitutional: She appearswell-nourished. No apparent distress. Breast: The patient was examined in the upright and supine position. She has a \"D\" cup breast. Breasts are symmetrically ptotic. Right: No new masses or changes in breast contour. No skin changes of the breast or nipple areolar complex. No nipple inversion or discharge. No erythema, thickening (peau d'orange), or dimpling. Left: No new masses or changes in breast contour. No skin changes of the breast or nipple areolar complex. No nipple inversion or discharge. No erythema, thickening (peau d'orange), or dimpling. There is no axillary lymphadenopathy palpated bilaterally. Head: Normocephalic and atraumatic  Eyes: EOM are normal. Pupils are equal, round, and reactive to light. Neck: Neck supple. No tracheal deviation present. No obvious mass. Cardiovascular: regular rate. Pulmonary: No accessory muscle use. Respirations non-labored and no wheezing. Lymphatics: No palpable supraclavicular, cervical, or axillary lymphadenopathy  Skin: No rash noted. No erythema. Neurologic: alert and oriented. Extremities: appear well perfused. No edema. No joint deformity         Risk assessment using CARI Breast Cancer Risk Evaluation Tool to evaluate her risk compared to the general population.   Her ten year risk of breast cancer is 0.9% (general population average 0.5%). Her lifetime risk for breast cancer is 20.2% (general population average 3.3%). ASSESSMENT:  - High Risk for Breast Cancer based on increased risk profile for breast cancer. Tyrer-Artiezick (CARI) 8.0 Model calculated risk at  - 20.2% today. - Screening Breast Examination   - Breast Pain - likely cyclic   - Dense breast tissue   - Family History of Ovarian Cancer - maternal grandmother   - Family History of Breast Cancer - maternal grandmother and paternal grandmother     PLAN:    1. Surveillance: We discussed the NCCN guidelines for high risk surveillance. This includes annual screening mammography, annual screening MRI, and clinical breast exams every 6-12 months. We also stressed the importance of breast awareness. - Bilateral screening mammogram and clinical breast exam due: 3/2022   - Bilateral breast MRI and clinical breast exam due: 8/2021. She is considering spacing out annual MRI to every other year. Which is very reasonable. 2. Requested to bring genetic testing results to office      5. Education provided for Healthy Lifestyle Recommendations: healthy diet, routine exercise, weight control, decreased alcohol consumption. She is exercising 3 days/week. OMI [facility])   Surgical Breast Oncology   [phone]      On this date, I have spent 25 minutes reviewing previous notes, test results and face to face with the patient discussing the diagnosis and importance of compliance with the treatment plan as well as documenting on the day of the visit. 0

## 2021-09-24 ENCOUNTER — VIRTUAL VISIT (OUTPATIENT)
Dept: INTERNAL MEDICINE CLINIC | Age: 31
End: 2021-09-24
Payer: COMMERCIAL

## 2021-09-24 DIAGNOSIS — F90.0 ATTENTION DEFICIT HYPERACTIVITY DISORDER (ADHD), PREDOMINANTLY INATTENTIVE TYPE: ICD-10-CM

## 2021-09-24 PROCEDURE — 99213 OFFICE O/P EST LOW 20 MIN: CPT | Performed by: NURSE PRACTITIONER

## 2021-09-24 RX ORDER — DEXTROAMPHETAMINE SACCHARATE, AMPHETAMINE ASPARTATE MONOHYDRATE, DEXTROAMPHETAMINE SULFATE AND AMPHETAMINE SULFATE 3.75; 3.75; 3.75; 3.75 MG/1; MG/1; MG/1; MG/1
15 CAPSULE, EXTENDED RELEASE ORAL DAILY
Qty: 30 CAPSULE | Refills: 0 | Status: SHIPPED | OUTPATIENT
Start: 2021-10-24 | End: 2021-11-23

## 2021-09-24 RX ORDER — DEXTROAMPHETAMINE SACCHARATE, AMPHETAMINE ASPARTATE MONOHYDRATE, DEXTROAMPHETAMINE SULFATE AND AMPHETAMINE SULFATE 3.75; 3.75; 3.75; 3.75 MG/1; MG/1; MG/1; MG/1
15 CAPSULE, EXTENDED RELEASE ORAL DAILY
Qty: 30 CAPSULE | Refills: 0 | Status: SHIPPED | OUTPATIENT
Start: 2021-11-24 | End: 2021-12-24

## 2021-09-24 RX ORDER — DEXTROAMPHETAMINE SACCHARATE, AMPHETAMINE ASPARTATE MONOHYDRATE, DEXTROAMPHETAMINE SULFATE AND AMPHETAMINE SULFATE 3.75; 3.75; 3.75; 3.75 MG/1; MG/1; MG/1; MG/1
15 CAPSULE, EXTENDED RELEASE ORAL DAILY
Qty: 30 CAPSULE | Refills: 0 | Status: SHIPPED | OUTPATIENT
Start: 2021-09-24 | End: 2021-10-25

## 2021-09-24 NOTE — PROGRESS NOTES
2021    TELEHEALTH EVALUATION -- Audio/Visual (During [place] [address] public health emergency)    HPI:    Eli Pitt (:  1990) has requested an audio/video evaluation for the following concern(s):    VV for ADHD   Recently started on adderall 15 mg XR   Reports symptoms are well controled  Denies any s/e of medication     Review of Systems  Negative other than HPI   Prior to Visit Medications    Medication Sig Taking? Authorizing Provider   amphetamine-dextroamphetamine (ADDERALL XR) 15 MG extended release capsule Take 1 capsule by mouth daily for 30 days. Yes OMI Kyle CNP   amphetamine-dextroamphetamine (ADDERALL XR) 15 MG extended release capsule Take 1 capsule by mouth daily for 30 days. Yes OMI Kyle CNP   amphetamine-dextroamphetamine (ADDERALL XR) 15 MG extended release capsule Take 1 capsule by mouth daily for 30 days. Yes OMI Huitron CNP   UBRELVY 50 MG TABS TAKE 1 TABLET BY MOUTH AS NEEDED FOR MIGRAINE. MAY REPEAT IN 2 HOURS. OMI Chino CNP   SUMAtriptan (IMITREX) 100 MG tablet TAKE 1 TABLET BY MOUTH AT ONSET OF HEADACHE. MAY REPEAT DOSE IN 2 HOURS IF NO RELIEF. DO NOT EXCEED 2 TABLETS IN 24 HOURS  OMI Huitron CNP   topiramate (TOPAMAX) 50 MG tablet Take 2 tablets by mouth 2 times daily  OMI Huitron CNP   vitamin D (ERGOCALCIFEROL) 1.25 MG (33750 UT) CAPS capsule Take 1 capsule by mouth once a week  OMI Kyle CNP   famotidine (PEPCID AC) 10 MG tablet Take 10 mg by mouth 2 times daily  Historical Provider, MD BEASLEY LO 0.1-0.02 & 0.01 MG TABS TK 1 T PO QD  Historical Provider, MD   cetirizine (ZYRTEC ALLERGY) 10 MG tablet Take 1 tablet by mouth daily as needed for Allergies (hives)  Gutierrez Muhammad MD       Social History     Tobacco Use    Smoking status: Former Smoker    Smokeless tobacco: Never Used   Vaping Use    Vaping Use: Never used   Substance Use Topics    Alcohol use:  Yes Comment: occasionally    Drug use: No          PHYSICAL EXAMINATION:  [ INSTRUCTIONS:  \"[x]\" Indicates a positive item  \"[]\" Indicates a negative item  -- DELETE ALL ITEMS NOT EXAMINED]  Vital Signs: (As obtained by patient/caregiver or practitioner observation)    No flowsheet data found. Physical Exam  Constitutional:       General: She is not in acute distress. Appearance: Normal appearance. She is not ill-appearing. HENT:      Head: Normocephalic and atraumatic. Pulmonary:      Effort: Pulmonary effort is normal. No respiratory distress. Neurological:      General: No focal deficit present. Mental Status: She is alert and oriented to person, place, and time. Mental status is at baseline. Psychiatric:         Mood and Affect: Mood normal.         Behavior: Behavior normal.        Other pertinent observable physical exam findings-     Due to this being a TeleHealth encounter, evaluation of the following organ systems is limited: Vitals/Constitutional/EENT/Resp/CV/GI//MS/Neuro/Skin/Heme-Lymph-Imm. ASSESSMENT/PLAN:  1. Attention deficit hyperactivity disorder (ADHD), predominantly inattentive type  Well controlled, continue current regimen. - amphetamine-dextroamphetamine (ADDERALL XR) 15 MG extended release capsule; Take 1 capsule by mouth daily for 30 days. Dispense: 30 capsule; Refill: 0  - amphetamine-dextroamphetamine (ADDERALL XR) 15 MG extended release capsule; Take 1 capsule by mouth daily for 30 days. Dispense: 30 capsule; Refill: 0  - amphetamine-dextroamphetamine (ADDERALL XR) 15 MG extended release capsule; Take 1 capsule by mouth daily for 30 days. Dispense: 30 capsule; Refill: 0    An  electronic signature was used to authenticate this note.     --OMI Dillon - CNP on 9/24/2021 at 2:31 PM        Pursuant to the emergency declaration under the [facility] waiver authority and the [employer]

## 2021-09-30 ENCOUNTER — TELEPHONE (OUTPATIENT)
Dept: SURGERY | Age: 31
End: 2021-09-30

## 2021-09-30 NOTE — TELEPHONE ENCOUNTER
Patient called to cancel appointment for 10/1/21 due to work. Patient Rescheduled for 10/25/21 . If this cancellation is within 24 hours of appointment time, it is counted as same day cancellation. PLAN:               1. Surveillance: We discussed the NCCN guidelines for high risk surveillance. This includes annual screening mammography, annual screening MRI, and clinical breast exams every 6-12 months. We also stressed the importance of breast awareness. - Bilateral screening mammogram and clinical breast exam due: 3/2022              - Bilateral breast MRI and clinical breast exam due: 8/2021. She is considering spacing out annual MRI to every other year. Which is very reasonable.                  2. Requested to bring genetic testing results to office                  5. Education provided for Healthy Lifestyle Recommendations: healthy diet, routine exercise, weight control, decreased alcohol consumption. She is exercising 3 days/week.

## 2021-10-05 RX ORDER — METRONIDAZOLE 500 MG/1
500 TABLET ORAL 2 TIMES DAILY
Qty: 14 TABLET | Refills: 0 | Status: SHIPPED | OUTPATIENT
Start: 2021-10-05 | End: 2021-10-12

## 2021-10-25 ENCOUNTER — OFFICE VISIT (OUTPATIENT)
Dept: SURGERY | Age: 31
End: 2021-10-25
Payer: COMMERCIAL

## 2021-10-25 VITALS
BODY MASS INDEX: 27.83 KG/M2 | HEIGHT: 64 IN | TEMPERATURE: 97.5 F | SYSTOLIC BLOOD PRESSURE: 110 MMHG | DIASTOLIC BLOOD PRESSURE: 82 MMHG | WEIGHT: 163 LBS

## 2021-10-25 DIAGNOSIS — Z91.89 AT HIGH RISK FOR BREAST CANCER: Primary | ICD-10-CM

## 2021-10-25 DIAGNOSIS — Z80.3 FAMILY HISTORY OF BREAST CANCER: ICD-10-CM

## 2021-10-25 DIAGNOSIS — Z12.39 ENCOUNTER FOR SCREENING BREAST EXAMINATION: ICD-10-CM

## 2021-10-25 DIAGNOSIS — Z80.41 FAMILY HISTORY OF OVARIAN CANCER: ICD-10-CM

## 2021-10-25 DIAGNOSIS — R92.2 DENSE BREAST TISSUE ON MAMMOGRAM: ICD-10-CM

## 2021-10-25 DIAGNOSIS — Z12.39 BREAST CANCER SCREENING, HIGH RISK PATIENT: ICD-10-CM

## 2021-10-25 PROCEDURE — 99213 OFFICE O/P EST LOW 20 MIN: CPT | Performed by: NURSE PRACTITIONER

## 2021-10-25 NOTE — PATIENT INSTRUCTIONS
Healthy Lifestyle Recommendations: healthy diet (decrease consumption of red meat, increase fresh fruits and vegetables), decreased alcohol consumption (less than 4 drinks/week), adequate sleep (goal 6-8 hours), routine exercise (goal 150 minutes/week or greater), weight control. Patient Education        Breast Self-Exam: Care Instructions  Your Care Instructions     A breast self-exam is when you check your breasts for lumps or changes. This regular exam helps you learn how your breasts normally look and feel. Most breast problems or changes are not because of cancer. Breast self-exam is not a substitute for a mammogram. Having regular breast exams by your doctor and regular mammograms improve your chances of finding any problems with your breasts. Some women set a time each month to do a step-by-step breast self-exam. Other women like a less formal system. They might look at their breasts as they brush their teeth, or feel their breasts once in a while in the shower. If you notice a change in your breast, tell your doctor. Follow-up care is a key part of your treatment and safety. Be sure to make and go to all appointments, and call your doctor if you are having problems. It's also a good idea to know your test results and keep a list of the medicines you take. How do you do a breast self-exam?  · The best time to examine your breasts is usually one week after your menstrual period begins. Your breasts should not be tender then. If you do not have periods, you might do your exam on a day of the month that is easy to remember. · To examine your breasts:  ? Remove all your clothes above the waist and lie down. When you are lying down, your breast tissue spreads evenly over your chest wall, which makes it easier to feel all your breast tissue. ?  Use the pads--not the fingertips--of the 3 middle fingers of your left hand to check your right breast. Move your fingers slowly in small coin-sized circles that overlap. ? Use three levels of pressure to feel of all your breast tissue. Use light pressure to feel the tissue close to the skin surface. Use medium pressure to feel a little deeper. Use firm pressure to feel your tissue close to your breastbone and ribs. Use each pressure level to feel your breast tissue before moving on to the next spot. ? Check your entire breast, moving up and down as if following a strip from the collarbone to the bra line, and from the armpit to the ribs. Repeat until you have covered the entire breast.  ? Repeat this procedure for your left breast, using the pads of the 3 middle fingers of your right hand. · To examine your breasts while in the shower:  ? Place one arm over your head and lightly soap your breast on that side. ? Using the pads of your fingers, gently move your hand over your breast (in the strip pattern described above), feeling carefully for any lumps or changes. ? Repeat for the other breast.  · Have your doctor inspect anything you notice to see if you need further testing. Where can you learn more? Go to [email or web address] and sign in to your [employer] account. Enter P148 in the [employer] box to learn more about \"Breast Self-Exam: Care Instructions. \"     If you do not have an account, please click on the \"Sign Up Now\" link. Current as of: December 17, 2020               Content Version: 13.0  © 3568-1594 Healthwise, Incorporated. Care instructions adapted under license by [facility] ([facility]). If you have questions about a medical condition or this instruction, always ask your healthcare professional. [place] [address] any warranty or liability for your use of this information.

## 2021-10-25 NOTE — PROGRESS NOTES
Surgical Breast Oncology     CC: High Risk for Breast Cancer      HPI:  David Berman is a 27 y.o.  woman here for routine follow up for high risk for breast cancer. Overall doing well and has no breast related concerns or changes in her recent health. She states that she does occassioanlly perform routine self breast evaluations but not monthly. She has not noticed any new abnormalities such as masses, skin changes, color changes, nipple discharge, or changes to the nipple-areolar complex. Her family cancer history is significant for ovarian and breast cancer in her maternal grandmother in addition to breast cancer in her paternal grandmother. She has not had a breast biopsy in the past.      Had 8-9 gene genetic testing at her gynecologist office, reports results negative. Has two girls, 15 and 7yo. 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 Getting  and hoping to conceive in the next 6 months. INTERVAL HISTORY:  Bilateral screening mammogram 2020:  No concerning findings suggestive of malignancy. BI-RADS 1. Bilateral screening MRI 2020:  Negative MRI of the breast.  BI-RADS 1. Bilateral screening mammogram 3/12/2021:  Breast stroma is heterogeneously dense. No new suspicious or concerning findings suggestive of malignancy. BI-RADS 2. Past Medical History:   Diagnosis Date    Chronic idiopathic urticaria     Depressive disorder, not elsewhere classified     Headache(784.0)     Migraine, unspecified, without mention of intractable migraine without mention of status migrainosus        Past Surgical History:   Procedure Laterality Date    MOUTH SURGERY      OTHER SURGICAL HISTORY      2 Vaginal births     Menstrual History:  Menarche age 5.      Age first live birth 24  Breastfeeding Yes for 6 months   premenopausal   Oral contraceptives Yes  Hormone replacement No     Family History   Problem Relation Age of Onset    No Known Problems Mother    Celina Lucia Other Father Pulmonary Embolism    High Cholesterol Father     Other Sister         Addiciont    Breast Cancer Maternal Grandmother         66's    Ovarian Cancer Maternal Grandmother         63's    Breast Cancer Paternal Grandmother         early 66's     Family History Significant for Cancer:   Paternal Grandmother, breast cancer (possibly bilateral?), dx 68,  80  Maternal Grandmother, breast cancer, dx 68, ovarian cancer dx62,  80    Allergies as of 10/25/2021    (No Known Allergies)       Social History     Tobacco Use    Smoking status: Former Smoker    Smokeless tobacco: Never Used   Vaping Use    Vaping Use: Never used   Substance Use Topics    Alcohol use: Yes     Comment: occasionally    Drug use: No         Current Outpatient Medications:     [START ON 2021] amphetamine-dextroamphetamine (ADDERALL XR) 15 MG extended release capsule, Take 1 capsule by mouth daily for 30 days. , Disp: 30 capsule, Rfl: 0    amphetamine-dextroamphetamine (ADDERALL XR) 15 MG extended release capsule, Take 1 capsule by mouth daily for 30 days. , Disp: 30 capsule, Rfl: 0    UBRELVY 50 MG TABS, TAKE 1 TABLET BY MOUTH AS NEEDED FOR MIGRAINE. MAY REPEAT IN 2 HOURS., Disp: 10 tablet, Rfl: 5    SUMAtriptan (IMITREX) 100 MG tablet, TAKE 1 TABLET BY MOUTH AT ONSET OF HEADACHE. MAY REPEAT DOSE IN 2 HOURS IF NO RELIEF. DO NOT EXCEED 2 TABLETS IN 24 HOURS, Disp: 9 tablet, Rfl: 0    vitamin D (ERGOCALCIFEROL) 1.25 MG (75879 UT) CAPS capsule, Take 1 capsule by mouth once a week, Disp: 12 capsule, Rfl: 1    famotidine (PEPCID AC) 10 MG tablet, Take 10 mg by mouth 2 times daily, Disp: , Rfl:     AMETHIA LO 0.1-0.02 & 0.01 MG TABS, TK 1 T PO QD, Disp: , Rfl: 0    cetirizine (ZYRTEC ALLERGY) 10 MG tablet, Take 1 tablet by mouth daily as needed for Allergies (hives), Disp: 30 tablet, Rfl: 5      Medications: documentation has been reviewed in the electronic medical record and patient office intake form.     REVIEW OF SYSTEMS:  Constitutional: Negative for fever  HENT: Negative for sore throat  Eyes: Negative for redness   Respiratory: Negative for dyspnea, cough  Cardiovascular: Negative for chest pain  Gastrointestinal: Negative for vomiting, diarrhea   Genitourinary: Negative for hematuria   Musculoskeletal: Negative for arthralgias   Skin: Negative for rash  Neurological: Negative for syncope  Hematological: Negative for adenopathy  Psychiatric/Behavorial: Negative for anxiety    PHYSICAL EXAM:  /82 (Site: Left Upper Arm, Position: Sitting, Cuff Size: Medium Adult)   Temp 97.5 °F (36.4 °C) (Temporal)   Ht 5' 4\" (1.626 m)   Wt 163 lb (73.9 kg)   BMI 27.98 kg/m²   Constitutional: She appearswell-nourished. No apparent distress. Breast: The patient was examined in the upright and supine position. She has a \"D\" cup breast. Breasts are symmetrically ptotic. Right: No new masses or changes in breast contour. No skin changes of the breast or nipple areolar complex. No nipple inversion or discharge. No erythema, thickening (peau d'orange), or dimpling. Left: No new masses or changes in breast contour. No skin changes of the breast or nipple areolar complex. No nipple inversion or discharge. No erythema, thickening (peau d'orange), or dimpling. There is no axillary lymphadenopathy palpated bilaterally. Head: Normocephalic and atraumatic  Eyes: EOM are normal. Pupils are equal, round, and reactive to light. Neck: Neck supple. No tracheal deviation present. No obvious mass. Lymphatics: No palpable supraclavicular, cervical, or axillary lymphadenopathy  Skin: No rash noted. No erythema. Neurologic: alert and oriented. Extremities: appear well perfused. Risk assessment using CARI Breast Cancer Risk Evaluation Tool to evaluate her risk compared to the general population. Her ten year risk of breast cancer is 0.9% (general population average 0.5%).   Her lifetime risk for breast cancer is 20.2% (general population average 3.3%). ASSESSMENT:  - High Risk for Breast Cancer based on increased risk profile for breast cancer. Tyrer-Cuzick (CARI) 8.0 Model calculated risk at  - 20.2% today. - Screening Breast Examination   - Dense breast tissue   - Family History of Ovarian Cancer - maternal grandmother   - Family History of Breast Cancer - maternal grandmother and paternal grandmother     PLAN:    1. Surveillance: We discussed the NCCN guidelines for high risk surveillance. This includes annual screening mammography, annual screening MRI, and clinical breast exams every 6-12 months. We also stressed the importance of breast awareness. - Bilateral screening mammogram and clinical breast exam due: 3/2022   - Bilateral breast MRI and clinical breast exam due: now. She is considering spacing out annual MRI to every other year. Which is very reasonable. 2. Requested to bring genetic testing results to office      3. Education provided for Healthy Lifestyle Recommendations: healthy diet, routine exercise, weight control, decreased alcohol consumption. She is exercising 3 days/week. 4.  Reviewed safe breast imaging guidelines during pregnancy and breast feeding. OMI [facility])   Surgical Breast Oncology   [phone]    All of the patient's questions were answered at this time however, she was encouraged to call the office with any further inquiries. Approximately 20 minutes of time were spent in preparation, direct patient contact, counseling, care coordination, documentation and activities otherwise related to this encounter.

## 2021-11-04 ENCOUNTER — HOSPITAL ENCOUNTER (OUTPATIENT)
Dept: MRI IMAGING | Age: 31
Discharge: HOME | End: 2021-11-04
Payer: COMMERCIAL

## 2021-11-04 DIAGNOSIS — Z80.41 FAMILY HISTORY OF OVARIAN CANCER: ICD-10-CM

## 2021-11-04 DIAGNOSIS — Z12.39 BREAST CANCER SCREENING, HIGH RISK PATIENT: ICD-10-CM

## 2021-11-04 DIAGNOSIS — Z91.89 AT HIGH RISK FOR BREAST CANCER: ICD-10-CM

## 2021-11-04 DIAGNOSIS — R92.2 DENSE BREAST TISSUE ON MAMMOGRAM: ICD-10-CM

## 2021-11-04 DIAGNOSIS — Z80.3 FAMILY HISTORY OF BREAST CANCER: ICD-10-CM

## 2021-11-04 PROCEDURE — A9579 GAD-BASE MR CONTRAST NOS,1ML: HCPCS | Performed by: NURSE PRACTITIONER

## 2021-11-04 PROCEDURE — 6360000004 HC RX CONTRAST MEDICATION: Performed by: NURSE PRACTITIONER

## 2021-11-04 PROCEDURE — 77049 MRI BREAST C-+ W/CAD BI: CPT

## 2021-11-04 RX ADMIN — GADOTERIDOL 15 ML: 279.3 INJECTION, SOLUTION INTRAVENOUS at 15:35

## 2021-11-08 ENCOUNTER — TELEPHONE (OUTPATIENT)
Dept: SURGERY | Age: 31
End: 2021-11-08

## 2021-11-08 DIAGNOSIS — Z91.89 AT HIGH RISK FOR BREAST CANCER: ICD-10-CM

## 2021-11-08 DIAGNOSIS — R92.8 ABNORMAL FINDING ON BREAST IMAGING: Primary | ICD-10-CM

## 2021-11-08 NOTE — TELEPHONE ENCOUNTER
Patient is returning Tiffanie House's phone call about her MRI results. She can be reached at [phone].

## 2021-11-12 ENCOUNTER — HOSPITAL ENCOUNTER (OUTPATIENT)
Dept: ULTRASOUND IMAGING | Age: 31
Discharge: HOME | End: 2021-11-12
Payer: COMMERCIAL

## 2021-11-12 DIAGNOSIS — R92.8 ABNORMAL FINDING ON BREAST IMAGING: Primary | ICD-10-CM

## 2021-11-12 DIAGNOSIS — Z91.89 AT HIGH RISK FOR BREAST CANCER: ICD-10-CM

## 2021-11-12 DIAGNOSIS — R92.8 ABNORMAL FINDING ON BREAST IMAGING: ICD-10-CM

## 2021-11-12 PROCEDURE — 76642 ULTRASOUND BREAST LIMITED: CPT

## 2021-11-15 ENCOUNTER — TELEPHONE (OUTPATIENT)
Dept: WOMENS IMAGING | Age: 31
End: 2021-11-15

## 2021-11-15 RX ORDER — NITROFURANTOIN 25; 75 MG/1; MG/1
100 CAPSULE ORAL 2 TIMES DAILY
Qty: 10 CAPSULE | Refills: 0 | Status: SHIPPED | OUTPATIENT
Start: 2021-11-15 | End: 2021-11-20

## 2021-11-15 NOTE — TELEPHONE ENCOUNTER
intravenous (IV) catheter, into a vein in your hand or arm.  You will be placed into the magnet of the MRI unit and the radiologist and technologist will perform the examination while working at a computer outside of the room.  If a contrast material is used during the examination, it will be injected into the intravenous line (IV) after an initial series of scans. Additional series of images will be taken during or following the injection and at different points during the procedure.  Then the skin is cleaned and a local anesthetic (slight pin prick) is given to numb the specific area which takes effect very quickly. A small skin nick is made so that the biopsy needle can be inserted.  Once the biopsy needle is in the proper position, samples will be taken. A tiny tissue marker (titanium) is then placed inside your breast at the site of the biopsy for future reference.  Pressure is then held over the biopsy site to stop the bleeding. No sutures are necessary as steri-strips and a waterproof dressing are applied.  Most women report little or no pain and no scarring on the breast, though some bruising can occur. After your biopsy:    A mammogram may be done to make sure the tissue marker is in the right place.  You will receive a set of follow-up instructions from the nurse navigator.  The biopsy sample is sent to the Pathology department for analysis.  Results of the biopsy will come back in 2-3 business days. The results will be sent to your referring physician. Patient verbalized understanding.

## 2021-11-29 ENCOUNTER — HOSPITAL ENCOUNTER (OUTPATIENT)
Dept: MRI IMAGING | Age: 31
Discharge: HOME | End: 2021-11-29
Payer: COMMERCIAL

## 2021-11-29 ENCOUNTER — HOSPITAL ENCOUNTER (OUTPATIENT)
Dept: WOMENS IMAGING | Age: 31
Discharge: HOME | End: 2021-11-29
Payer: COMMERCIAL

## 2021-11-29 DIAGNOSIS — R92.8 ABNORMAL FINDING ON BREAST IMAGING: ICD-10-CM

## 2021-11-29 DIAGNOSIS — Z98.890 S/P BREAST BIOPSY, RIGHT: ICD-10-CM

## 2021-11-29 PROCEDURE — 6360000004 HC RX CONTRAST MEDICATION: Performed by: NURSE PRACTITIONER

## 2021-11-29 PROCEDURE — 88305 TISSUE EXAM BY PATHOLOGIST: CPT

## 2021-11-29 PROCEDURE — A9579 GAD-BASE MR CONTRAST NOS,1ML: HCPCS | Performed by: NURSE PRACTITIONER

## 2021-11-29 PROCEDURE — 19085 BX BREAST 1ST LESION MR IMAG: CPT

## 2021-11-29 PROCEDURE — 77065 DX MAMMO INCL CAD UNI: CPT

## 2021-11-29 RX ADMIN — GADOTERIDOL 15 ML: 279.3 INJECTION, SOLUTION INTRAVENOUS at 13:44

## 2021-11-29 NOTE — PROGRESS NOTES
Patient in [address] [place] [address] for breast biopsy. Radiologist reviewed procedure with patient, consent signed. Patient tolerated procedure well. Compression held. Site cleansed with chloraprep, steri strips and dry dressing applied. Ice pack provided. Reviewed discharge instructions with patient. Patient verbalized understanding and agreed to contact the Breast Navigator with any questions. Patient was A&Ox3 and steady on feet and discharged to waiting area. The [address]   Discharge Instructions  [facility]  [address]  [address]  Telephone: [phone]    NAME:  Yoly Snyder OF BIRTH:  1990  GENDER: female  MEDICAL RECORD NUMBER:  [id number]  TODAY'S DATE:  11/29/2021    Discharge Instructions Breast Center:    Post Breast Biopsy Instructions     [x] You may remove your outer dressing in 24 hours and you may shower. \"Steri-strips\" tape will stay on for 5-7 days. [x] Place a cold pack inside your bra on top of the dressing for at least 3 hours, removing it every 15 minutes for 15 minutes after your biopsy. [x] Wear a firm fitting bra for at least 24 hours after your biopsy, including while you sleep. [x] Place an ice pack inside your bra on top of the dressing for at least 3 hours, removing it every 15 minutes for 15 minutes after your biopsy. [x] You may resume your held medications tomorrow, unless otherwise directed by your physician. [x] Your physician has instructed you to take Tylenol (Acetaminophen) the day of your biopsy for any discomfort. [x] Watch for excessive bleeding. If bleeding occurs apply pressure to site. Watch for signs of infection, increased pain, redness, swelling and heat. If this occurs call your physician. [x] Do not participate in any strenuous exercise for 48 hours after your biopsy and do not lift, pull or push anything over 5-10 lbs.       [x] Results will be available in 2-3 working days.  Your referring physician or the Nurse Navigator will call you with results. The Breast Center Information:   Should you experience any significant changes in your health or have questions about your care, please contact:  Mannie Lakhani or Deepika Casey, Breast Navigators with [facility]  [phone]  Monday-Friday. If you need help with your care outside these hours and cannot wait until we are again available, contact your Physician or go to the hospital emergency room.         Electronically signed by Mannie Lakhani RN on 11/29/2021 at 2:31 PM

## 2021-11-30 ENCOUNTER — TELEPHONE (OUTPATIENT)
Dept: WOMENS IMAGING | Age: 31
End: 2021-11-30

## 2021-11-30 NOTE — TELEPHONE ENCOUNTER
Pathology results complete from breast biopsy. Radiologist confirms concordance. Breast Navigator reviewed results of breast biopsy with patient. Results are negative for any malignancy on the pathology report. Radiologist is recommending a routine breast MRI in one year. Patient verbalized understanding. Path report faxed to referring physician.      Alexia Lamb RN

## 2022-01-05 ENCOUNTER — IMMUNIZATION (OUTPATIENT)
Dept: FAMILY MEDICINE CLINIC | Age: 32
End: 2022-01-05
Payer: COMMERCIAL

## 2022-01-20 LAB
AMPHETAMINE SCREEN, URINE: NORMAL
BARBITURATE SCREEN URINE: NORMAL
BENZODIAZEPINE SCREEN, URINE: NORMAL
CANNABINOID SCREEN URINE: NORMAL
COCAINE METABOLITE SCREEN URINE: NORMAL
Lab: NORMAL
METHADONE SCREEN, URINE: NORMAL
OPIATE SCREEN URINE: NORMAL
OXYCODONE URINE: NORMAL
PH UA: 6
PHENCYCLIDINE SCREEN URINE: NORMAL
PROPOXYPHENE SCREEN: NORMAL

## 2022-01-21 LAB — URINE CULTURE, ROUTINE: NORMAL

## 2022-01-22 ENCOUNTER — TELEPHONE (OUTPATIENT)
Dept: INTERNAL MEDICINE CLINIC | Age: 32
End: 2022-01-22

## 2022-01-22 LAB
C TRACH DNA GENITAL QL NAA+PROBE: NEGATIVE
N. GONORRHOEAE DNA: NEGATIVE

## 2022-01-22 RX ORDER — AMOXICILLIN 500 MG/1
500 CAPSULE ORAL 2 TIMES DAILY
Qty: 14 CAPSULE | Refills: 0 | Status: SHIPPED | OUTPATIENT
Start: 2022-01-22 | End: 2022-01-29

## 2022-03-02 LAB
ABO/RH: NORMAL
ANTIBODY SCREEN: NORMAL
HEPATITIS C ANTIBODY INTERPRETATION: NORMAL

## 2022-03-03 LAB
BASOPHILS ABSOLUTE: 0 K/UL (ref 0–0.2)
BASOPHILS RELATIVE PERCENT: 0.4 %
EOSINOPHILS ABSOLUTE: 0.1 K/UL (ref 0–0.6)
EOSINOPHILS RELATIVE PERCENT: 1.1 %
HCT VFR BLD CALC: 37 % (ref 36–48)
HEMOGLOBIN: 12.6 G/DL (ref 12–16)
HEPATITIS B SURFACE ANTIGEN INTERPRETATION: ABNORMAL
HIV AG/AB: NORMAL
HIV ANTIGEN: NORMAL
HIV-1 ANTIBODY: NORMAL
HIV-2 AB: NORMAL
LYMPHOCYTES ABSOLUTE: 2.3 K/UL (ref 1–5.1)
LYMPHOCYTES RELATIVE PERCENT: 27 %
MCH RBC QN AUTO: 32.6 PG (ref 26–34)
MCHC RBC AUTO-ENTMCNC: 34.1 G/DL (ref 31–36)
MCV RBC AUTO: 95.7 FL (ref 80–100)
MONOCYTES ABSOLUTE: 0.7 K/UL (ref 0–1.3)
MONOCYTES RELATIVE PERCENT: 8.5 %
NEUTROPHILS ABSOLUTE: 5.3 K/UL (ref 1.7–7.7)
NEUTROPHILS RELATIVE PERCENT: 63 %
PDW BLD-RTO: 13 % (ref 12.4–15.4)
PLATELET # BLD: 211 K/UL (ref 135–450)
PMV BLD AUTO: 8.6 FL (ref 5–10.5)
RBC # BLD: 3.87 M/UL (ref 4–5.2)
RUBELLA ANTIBODY IGG: 88.3 IU/ML
TOTAL SYPHILLIS IGG/IGM: ABNORMAL
WBC # BLD: 8.5 K/UL (ref 4–11)

## 2022-04-22 ENCOUNTER — TELEPHONE (OUTPATIENT)
Dept: SURGERY | Age: 32
End: 2022-04-22

## 2022-04-22 NOTE — TELEPHONE ENCOUNTER
Patient called to cancel appointment for 04/25/22 due to work. Patient Rescheduled for 04/28/22. If this cancellation is within 24 hours of appointment time, it is counted as same day cancellation. PLAN:               1. Surveillance: We discussed the NCCN guidelines for high risk surveillance. This includes annual screening mammography, annual screening MRI, and clinical breast exams every 6-12 months. We also stressed the importance of breast awareness. - Bilateral screening mammogram and clinical breast exam due: 3/2022              - Bilateral breast MRI and clinical breast exam due: now. She is considering spacing out annual MRI to every other year. Which is very reasonable.                  2. Requested to bring genetic testing results to office                  3. Education provided for Healthy Lifestyle Recommendations: healthy diet, routine exercise, weight control, decreased alcohol consumption.   She is exercising 3 days/week.                 4.  Reviewed safe breast imaging guidelines during pregnancy and breast feeding.

## 2022-04-28 ENCOUNTER — OFFICE VISIT (OUTPATIENT)
Dept: SURGERY | Age: 32
End: 2022-04-28
Payer: COMMERCIAL

## 2022-04-28 VITALS
OXYGEN SATURATION: 100 % | HEIGHT: 64 IN | BODY MASS INDEX: 29.64 KG/M2 | DIASTOLIC BLOOD PRESSURE: 72 MMHG | SYSTOLIC BLOOD PRESSURE: 118 MMHG | HEART RATE: 83 BPM | TEMPERATURE: 97 F | WEIGHT: 173.6 LBS | RESPIRATION RATE: 18 BRPM

## 2022-04-28 DIAGNOSIS — Z91.89 AT HIGH RISK FOR BREAST CANCER: Primary | ICD-10-CM

## 2022-04-28 DIAGNOSIS — Z12.39 ENCOUNTER FOR SCREENING BREAST EXAMINATION: ICD-10-CM

## 2022-04-28 DIAGNOSIS — R92.2 DENSE BREAST TISSUE ON MAMMOGRAM: ICD-10-CM

## 2022-04-28 DIAGNOSIS — Z98.890 HISTORY OF RIGHT BREAST BIOPSY: ICD-10-CM

## 2022-04-28 PROCEDURE — 99213 OFFICE O/P EST LOW 20 MIN: CPT | Performed by: NURSE PRACTITIONER

## 2022-04-28 ASSESSMENT — ENCOUNTER SYMPTOMS
ABDOMINAL PAIN: 0
COUGH: 0
SHORTNESS OF BREATH: 0

## 2022-04-28 NOTE — PATIENT INSTRUCTIONS
Healthy Lifestyle Recommendations: healthy diet (decrease consumption of red meat, increase fresh fruits and vegetables), decreased alcohol consumption (less than 4 drinks/week), adequate sleep (goal 6-8 hours), routine exercise (goal 150 minutes/week or greater), weight control. Patient Education        Breast Self-Exam: Care Instructions  Your Care Instructions     A breast self-exam is when you check your breasts for lumps or changes. This regular exam helps you learn how your breasts normally look and feel. Mostbreast problems or changes are not because of cancer. Breast self-exam is not a substitute for a mammogram. Having regular breast exams by your doctor and regular mammograms improve your chances of finding anyproblems with your breasts. Some women set a time each month to do a step-by-step breast self-exam. Other women like a less formal system. They might look at their breasts as they brushtheir teeth, or feel their breasts once in a while in the shower. If you notice a change in your breast, tell your doctor. Follow-up care is a key part of your treatment and safety. Be sure to make and go to all appointments, and call your doctor if you are having problems. It's also a good idea to know your test results and keep alist of the medicines you take. How do you do a breast self-exam?   The best time to examine your breasts is usually one week after your menstrual period begins. Your breasts should not be tender then. If you do not have periods, you might do your exam on a day of the month that is easy to remember.  To examine your breasts:  ? Remove all your clothes above the waist and lie down. When you are lying down, your breast tissue spreads evenly over your chest wall, which makes it easier to feel all your breast tissue. ?  Use the pads--not the fingertips--of the 3 middle fingers of your left hand to check your right breast. Move your fingers slowly in small coin-sized circles that overlap. ? Use three levels of pressure to feel of all your breast tissue. Use light pressure to feel the tissue close to the skin surface. Use medium pressure to feel a little deeper. Use firm pressure to feel your tissue close to your breastbone and ribs. Use each pressure level to feel your breast tissue before moving on to the next spot. ? Check your entire breast, moving up and down as if following a strip from the collarbone to the bra line, and from the armpit to the ribs. Repeat until you have covered the entire breast.  ? Repeat this procedure for your left breast, using the pads of the 3 middle fingers of your right hand.  To examine your breasts while in the shower:  ? Place one arm over your head and lightly soap your breast on that side. ? Using the pads of your fingers, gently move your hand over your breast (in the strip pattern described above), feeling carefully for any lumps or changes. ? Repeat for the other breast.   Have your doctor inspect anything you notice to see if you need further testing. Where can you learn more? Go to [email or web address] org and sign in to your [employer] account. Enter P148 in the [facility] box to learn more about \"Breast Self-Exam: Care Instructions. \"     If you do not have an account, please click on the \"Sign Up Now\" link. Current as of: September 8, 2021               Content Version: 13.2  © 2006-2022 Healthwise, Incorporated. Care instructions adapted under license by [facility] ([facility]). If you have questions about a medical condition or this instruction, always ask your healthcare professional. [place] [address] any warranty or liability for your use of this information.

## 2022-04-28 NOTE — PROGRESS NOTES
Surgical Breast Oncology     CC: High Risk for Breast Cancer      HPI:  Marvin Irene is a 27 y.o.  woman here for routine follow up for high risk for breast cancer. Underwent MRI guided biopsy of the right breast 11/29/2021, pathology identified benign breast tissue with fibrocystic changes. Overall doing well. 22 weeks pregnant, due in August.  No breast related concerns today. She states that she does occassioanlly perform routine self breast evaluations. She has not noticed any new abnormalities such as masses, skin changes, color changes, nipple discharge, or changes to the nipple-areolar complex. Her family cancer history is significant for ovarian and breast cancer in her maternal grandmother in addition to breast cancer in her paternal grandmother. Had 8-9 gene genetic testing at her gynecologist office, reports results negative. Has two girls, 15 and 7yo. 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 INTERVAL HISTORY:  Bilateral screening mammogram 2/20/2020:  No concerning findings suggestive of malignancy. BI-RADS 1. Bilateral screening MRI 6/1/2020:  Negative MRI of the breast.  BI-RADS 1. Bilateral screening mammogram 3/12/2021:  Breast stroma is heterogeneously dense. No new suspicious or concerning findings suggestive of malignancy. BI-RADS 2. Bilateral breast MRI 11/4/2021:  Nonmasslike enhancement upper outer right breast mid depth. This likely represents benign parenchymal enhancement or fibrocystic change, but is new from prior study. Recommend targeted ultrasound. If no ultrasound correlate is identified, MRI biopsy   may be indicated. BI-RADS 0. No sonographic correlate was identified on right breast ultrasound 11/12/2021. Through conversation with radiology, patient, and myself the decision was made for MRI guided biopsy. Right breast MRI guided biopsy 11/29/2021:  Pathology identified benign breast tissue with fibrocystic changes, negative for atypia or malignancy. Radiology and pathology concordant. Berta Hidalgo BI-RADS 2. Past Medical History:   Diagnosis Date    Chronic idiopathic urticaria     Depressive disorder, not elsewhere classified     Headache(784.0)     Migraine, unspecified, without mention of intractable migraine without mention of status migrainosus        Past Surgical History:   Procedure Laterality Date    MOUTH SURGERY      MRI BREAST BX USING DEVICE RIGHT Right 2021    MRI BREAST BX USING DEVICE RIGHT 2021 [facility] EG MRI    OTHER SURGICAL HISTORY      2 Vaginal births     Menstrual History:  Menarche age 5.   Age first live birth 24  Breastfeeding Yes for 6 months   premenopausal   Oral contraceptives Yes  Hormone replacement No     Family History   Problem Relation Age of Onset    No Known Problems Mother     Other Father         Pulmonary Embolism    High Cholesterol Father     Other Sister         Addiciont    Breast Cancer Maternal Grandmother         66's    Ovarian Cancer Maternal Grandmother         63's    Breast Cancer Paternal Grandmother         early 66's     Family History Significant for Cancer:   Paternal Grandmother, breast cancer (possibly bilateral?), dx 68,  80  Maternal Grandmother, breast cancer, dx 68, ovarian cancer dx62,  80    Allergies as of 2022    (No Known Allergies)       Social History     Tobacco Use    Smoking status: Former Smoker    Smokeless tobacco: Never Used   Vaping Use    Vaping Use: Never used   Substance Use Topics    Alcohol use: Not Currently     Comment: occasionally    Drug use: No         Current Outpatient Medications:     cetirizine (ZYRTEC ALLERGY) 10 MG tablet, Take 1 tablet by mouth daily as needed for Allergies (hives), Disp: 30 tablet, Rfl: 5    amphetamine-dextroamphetamine (ADDERALL XR) 15 MG extended release capsule, Take 1 capsule by mouth daily for 30 days. , Disp: 30 capsule, Rfl: 0    amphetamine-dextroamphetamine (ADDERALL XR) 15 MG extended release capsule, Take 1 capsule by mouth daily for 30 days. , Disp: 30 capsule, Rfl: 0    UBRELVY 50 MG TABS, TAKE 1 TABLET BY MOUTH AS NEEDED FOR MIGRAINE. MAY REPEAT IN 2 HOURS. (Patient not taking: Reported on 4/28/2022), Disp: 10 tablet, Rfl: 5    SUMAtriptan (IMITREX) 100 MG tablet, TAKE 1 TABLET BY MOUTH AT ONSET OF HEADACHE. MAY REPEAT DOSE IN 2 HOURS IF NO RELIEF. DO NOT EXCEED 2 TABLETS IN 24 HOURS (Patient not taking: Reported on 4/28/2022), Disp: 9 tablet, Rfl: 0    vitamin D (ERGOCALCIFEROL) 1.25 MG (39474 UT) CAPS capsule, Take 1 capsule by mouth once a week (Patient not taking: Reported on 4/28/2022), Disp: 12 capsule, Rfl: 1    famotidine (PEPCID AC) 10 MG tablet, Take 10 mg by mouth 2 times daily (Patient not taking: Reported on 4/28/2022), Disp: , Rfl:     AMETHIA LO 0.1-0.02 & 0.01 MG TABS, TK 1 T PO QD (Patient not taking: Reported on 4/28/2022), Disp: , Rfl: 0      Medications: documentation has been reviewed in the electronic medical record and patient office intake form. REVIEW OF SYSTEMS:  Constitutional: Negative for fever  HENT: Negative for sore throat  Eyes: Negative for redness   Respiratory: Negative for dyspnea, cough  Cardiovascular: Negative for chest pain  Gastrointestinal: Negative for vomiting, diarrhea   Genitourinary: Negative for hematuria   Musculoskeletal: Negative for arthralgias   Skin: Negative for rash  Neurological: Negative for syncope  Hematological: Negative for adenopathy  Psychiatric/Behavorial: Negative for anxiety    PHYSICAL EXAM:  /72   Pulse 83   Temp 97 °F (36.1 °C)   Resp 18   Ht 5' 4\" (1.626 m)   Wt 173 lb 9.6 oz (78.7 kg)   SpO2 100%   BMI 29.80 kg/m²   Constitutional: She appearswell-nourished. No apparent distress. Breast: The patient was examined in the upright and supine position. She has a \"D\" cup breast. Breasts are symmetrically ptotic. Right: No new masses or changes in breast contour.   No skin changes of the breast or nipple areolar complex. No nipple inversion or discharge. No erythema, thickening (peau d'orange), or dimpling. Left: No new masses or changes in breast contour. No skin changes of the breast or nipple areolar complex. No nipple inversion or discharge. No erythema, thickening (peau d'orange), or dimpling. There is no axillary lymphadenopathy palpated bilaterally. Head: Normocephalic and atraumatic  Eyes: EOM are normal. Pupils are equal, round, and reactive to light. Neck: Neck supple. No tracheal deviation present. No obvious mass. Lymphatics: No palpable supraclavicular, cervical, or axillary lymphadenopathy  Skin: No rash noted. No erythema. Neurologic: alert and oriented. Extremities: appear well perfused. Risk assessment using CARI Breast Cancer Risk Evaluation Tool to evaluate her risk compared to the general population. Her ten year risk of breast cancer is 0.9% (general population average 0.5%). Her lifetime risk for breast cancer is 20.2% (general population average 3.3%). ASSESSMENT:  - High Risk for Breast Cancer based on increased risk profile for breast cancer. Tyrtommy-Desiree (CARI) 8.0 Model calculated risk at  - 20.2% today. - Screening Breast Examination   - Dense breast tissue   - History of right breast MRI guided biopsy 11/29/2021 - benign breast tissue with fibrocystic changes, negative for atypia or malignancy  - Family History of Ovarian Cancer - maternal grandmother   - Family History of Breast Cancer - maternal grandmother and paternal grandmother   - Pregnancy - 22 weeks     PLAN:    1. Surveillance: We discussed the NCCN guidelines for high risk surveillance. This includes annual screening mammography, annual screening MRI, and clinical breast exams every 6-12 months. We also stressed the importance of breast awareness. - Bilateral screening mammogram due: 3/2022.   Defer due to pregnancy     - Bilateral breast MRI and clinical breast exam due: 11/2022. Will likely hold off at this time as she is planning to breast feed. Will re-assess timing at f/u appt. - 6 month clinical breast exam due 10/2022      2. Requested to bring genetic testing results to office      3. Education provided for Healthy Lifestyle Recommendations: healthy diet, routine exercise, weight control, decreased alcohol consumption. She is exercising 3 days/week. OIM Berry-CNP  [facility])   Surgical Breast Oncology   [phone]    All of the patient's questions were answered at this time however, she was encouraged to call the office with any further inquiries. Approximately 20 minutes of time were spent in preparation, direct patient contact, counseling, care coordination, documentation and activities otherwise related to this encounter.

## 2022-06-02 LAB
ANTIBODY SCREEN: NORMAL
GLUCOSE CHALLENGE: 86 MG/DL
HCT VFR BLD CALC: 32.5 % (ref 36–48)
HEMOGLOBIN: 11.2 G/DL (ref 12–16)
MCH RBC QN AUTO: 34.9 PG (ref 26–34)
MCHC RBC AUTO-ENTMCNC: 34.4 G/DL (ref 31–36)
MCV RBC AUTO: 101.4 FL (ref 80–100)
PDW BLD-RTO: 13.6 % (ref 12.4–15.4)
PLATELET # BLD: 175 K/UL (ref 135–450)
PMV BLD AUTO: 8.4 FL (ref 5–10.5)
RBC # BLD: 3.2 M/UL (ref 4–5.2)
RPR, EXTERNAL RESULT: NORMAL
WBC # BLD: 6.1 K/UL (ref 4–11)

## 2022-06-03 LAB — TOTAL SYPHILLIS IGG/IGM: NORMAL

## 2022-07-26 ENCOUNTER — CARE COORDINATION (OUTPATIENT)
Dept: OTHER | Facility: CLINIC | Age: 32
End: 2022-07-26

## 2022-07-26 NOTE — CARE COORDINATION
ACM attempted to reach patient for introduction to Associate Care Management related to Maternity CM. HIPAA compliant message left requesting a return phone call. Will attempt to outreach patient again. QI Aleman, RN  Associate Care Manager   Cell: [phone]  [email or web address]

## 2022-08-04 ENCOUNTER — CARE COORDINATION (OUTPATIENT)
Dept: OTHER | Facility: CLINIC | Age: 32
End: 2022-08-04

## 2022-08-18 ENCOUNTER — CARE COORDINATION (OUTPATIENT)
Dept: OTHER | Facility: CLINIC | Age: 32
End: 2022-08-18

## 2022-08-18 NOTE — CARE COORDINATION
ACM attempted third and final call to patient for introduction to Associate Care Management r/t Maternity CM. HIPAA compliant message left requesting a return phone call at patients convenience. Final Unable to Reach Letter sent via [employer]. No further outreach scheduled with this ACM, ACM will sign off care team at this time. Patient has been provided with this ACM's contact information. QI Matthew, RN  Associate Care Manager   Cell: [phone]  [email or web address]

## 2022-08-19 LAB — GBS, EXTERNAL RESULT: NEGATIVE

## 2022-08-25 ENCOUNTER — ANESTHESIA (OUTPATIENT)
Dept: LABOR AND DELIVERY | Age: 32
End: 2022-08-25
Payer: COMMERCIAL

## 2022-08-25 ENCOUNTER — HOSPITAL ENCOUNTER (INPATIENT)
Age: 32
LOS: 1 days | Discharge: HOME | End: 2022-08-26
Attending: OBSTETRICS & GYNECOLOGY | Admitting: OBSTETRICS & GYNECOLOGY
Payer: COMMERCIAL

## 2022-08-25 ENCOUNTER — APPOINTMENT (OUTPATIENT)
Dept: LABOR AND DELIVERY | Age: 32
End: 2022-08-25
Payer: COMMERCIAL

## 2022-08-25 ENCOUNTER — ANESTHESIA EVENT (OUTPATIENT)
Dept: LABOR AND DELIVERY | Age: 32
End: 2022-08-25
Payer: COMMERCIAL

## 2022-08-25 LAB
ABO/RH: NORMAL
AMPHETAMINE SCREEN, URINE: NORMAL
ANTIBODY SCREEN: NORMAL
BARBITURATE SCREEN URINE: NORMAL
BASOPHILS ABSOLUTE: 0 K/UL (ref 0–0.2)
BASOPHILS RELATIVE PERCENT: 0.7 %
BENZODIAZEPINE SCREEN, URINE: NORMAL
BUPRENORPHINE URINE: NORMAL
CANNABINOID SCREEN URINE: NORMAL
COCAINE METABOLITE SCREEN URINE: NORMAL
EOSINOPHILS ABSOLUTE: 0.1 K/UL (ref 0–0.6)
EOSINOPHILS RELATIVE PERCENT: 1 %
HCT VFR BLD CALC: 31.9 % (ref 36–48)
HEMOGLOBIN: 11.1 G/DL (ref 12–16)
LYMPHOCYTES ABSOLUTE: 2 K/UL (ref 1–5.1)
LYMPHOCYTES RELATIVE PERCENT: 31.2 %
Lab: NORMAL
MCH RBC QN AUTO: 33.1 PG (ref 26–34)
MCHC RBC AUTO-ENTMCNC: 34.8 G/DL (ref 31–36)
MCV RBC AUTO: 95.1 FL (ref 80–100)
METHADONE SCREEN, URINE: NORMAL
MONOCYTES ABSOLUTE: 0.6 K/UL (ref 0–1.3)
MONOCYTES RELATIVE PERCENT: 8.8 %
NEUTROPHILS ABSOLUTE: 3.8 K/UL (ref 1.7–7.7)
NEUTROPHILS RELATIVE PERCENT: 58.3 %
OPIATE SCREEN URINE: NORMAL
OXYCODONE URINE: NORMAL
PDW BLD-RTO: 12.7 % (ref 12.4–15.4)
PH UA: 6
PHENCYCLIDINE SCREEN URINE: NORMAL
PLATELET # BLD: 144 K/UL (ref 135–450)
PMV BLD AUTO: 9.8 FL (ref 5–10.5)
PROPOXYPHENE SCREEN: NORMAL
RBC # BLD: 3.36 M/UL (ref 4–5.2)
TOTAL SYPHILLIS IGG/IGM: NORMAL
WBC # BLD: 6.6 K/UL (ref 4–11)

## 2022-08-25 PROCEDURE — 2580000003 HC RX 258: Performed by: OBSTETRICS & GYNECOLOGY

## 2022-08-25 PROCEDURE — 86900 BLOOD TYPING SEROLOGIC ABO: CPT

## 2022-08-25 PROCEDURE — 85025 COMPLETE CBC W/AUTO DIFF WBC: CPT

## 2022-08-25 PROCEDURE — 86780 TREPONEMA PALLIDUM: CPT

## 2022-08-25 PROCEDURE — 6370000000 HC RX 637 (ALT 250 FOR IP): Performed by: OBSTETRICS & GYNECOLOGY

## 2022-08-25 PROCEDURE — 6360000002 HC RX W HCPCS: Performed by: OBSTETRICS & GYNECOLOGY

## 2022-08-25 PROCEDURE — 1220000000 HC SEMI PRIVATE OB R&B

## 2022-08-25 PROCEDURE — 51701 INSERT BLADDER CATHETER: CPT

## 2022-08-25 PROCEDURE — 2500000003 HC RX 250 WO HCPCS

## 2022-08-25 PROCEDURE — 2500000003 HC RX 250 WO HCPCS: Performed by: NURSE ANESTHETIST, CERTIFIED REGISTERED

## 2022-08-25 PROCEDURE — 80307 DRUG TEST PRSMV CHEM ANLYZR: CPT

## 2022-08-25 PROCEDURE — 7200000001 HC VAGINAL DELIVERY

## 2022-08-25 PROCEDURE — 59025 FETAL NON-STRESS TEST: CPT

## 2022-08-25 PROCEDURE — 86850 RBC ANTIBODY SCREEN: CPT

## 2022-08-25 PROCEDURE — 3700000025 EPIDURAL BLOCK: Performed by: ANESTHESIOLOGY

## 2022-08-25 PROCEDURE — 86901 BLOOD TYPING SEROLOGIC RH(D): CPT

## 2022-08-25 PROCEDURE — S9443 LACTATION CLASS: HCPCS

## 2022-08-25 PROCEDURE — 3E033VJ INTRODUCTION OF OTHER HORMONE INTO PERIPHERAL VEIN, PERCUTANEOUS APPROACH: ICD-10-PCS | Performed by: OBSTETRICS & GYNECOLOGY

## 2022-08-25 RX ORDER — SODIUM CHLORIDE, SODIUM LACTATE, POTASSIUM CHLORIDE, AND CALCIUM CHLORIDE .6; .31; .03; .02 G/100ML; G/100ML; G/100ML; G/100ML
1000 INJECTION, SOLUTION INTRAVENOUS PRN
Status: DISCONTINUED | OUTPATIENT
Start: 2022-08-25 | End: 2022-08-25

## 2022-08-25 RX ORDER — SODIUM CHLORIDE 9 MG/ML
INJECTION, SOLUTION INTRAVENOUS PRN
Status: DISCONTINUED | OUTPATIENT
Start: 2022-08-25 | End: 2022-08-26 | Stop reason: HOSPADM

## 2022-08-25 RX ORDER — OXYCODONE HYDROCHLORIDE 5 MG/1
5 TABLET ORAL EVERY 4 HOURS PRN
Status: DISCONTINUED | OUTPATIENT
Start: 2022-08-25 | End: 2022-08-26 | Stop reason: HOSPADM

## 2022-08-25 RX ORDER — CARBOPROST TROMETHAMINE 250 UG/ML
250 INJECTION, SOLUTION INTRAMUSCULAR PRN
Status: DISCONTINUED | OUTPATIENT
Start: 2022-08-25 | End: 2022-08-25

## 2022-08-25 RX ORDER — LANOLIN 100 %
OINTMENT (GRAM) TOPICAL PRN
Status: DISCONTINUED | OUTPATIENT
Start: 2022-08-25 | End: 2022-08-26 | Stop reason: HOSPADM

## 2022-08-25 RX ORDER — NALBUPHINE HCL 10 MG/ML
5 AMPUL (ML) INJECTION EVERY 4 HOURS PRN
Status: DISCONTINUED | OUTPATIENT
Start: 2022-08-25 | End: 2022-08-25

## 2022-08-25 RX ORDER — ONDANSETRON 2 MG/ML
4 INJECTION INTRAMUSCULAR; INTRAVENOUS EVERY 6 HOURS PRN
Status: DISCONTINUED | OUTPATIENT
Start: 2022-08-25 | End: 2022-08-26 | Stop reason: HOSPADM

## 2022-08-25 RX ORDER — SODIUM CHLORIDE 0.9 % (FLUSH) 0.9 %
5-40 SYRINGE (ML) INJECTION PRN
Status: DISCONTINUED | OUTPATIENT
Start: 2022-08-25 | End: 2022-08-25

## 2022-08-25 RX ORDER — ACETAMINOPHEN 500 MG
1000 TABLET ORAL EVERY 6 HOURS PRN
Status: DISCONTINUED | OUTPATIENT
Start: 2022-08-25 | End: 2022-08-26 | Stop reason: HOSPADM

## 2022-08-25 RX ORDER — METHYLERGONOVINE MALEATE 0.2 MG/ML
200 INJECTION INTRAVENOUS PRN
Status: DISCONTINUED | OUTPATIENT
Start: 2022-08-25 | End: 2022-08-25

## 2022-08-25 RX ORDER — SODIUM CHLORIDE 0.9 % (FLUSH) 0.9 %
5-40 SYRINGE (ML) INJECTION EVERY 12 HOURS SCHEDULED
Status: DISCONTINUED | OUTPATIENT
Start: 2022-08-25 | End: 2022-08-25

## 2022-08-25 RX ORDER — ONDANSETRON 2 MG/ML
4 INJECTION INTRAMUSCULAR; INTRAVENOUS EVERY 6 HOURS PRN
Status: DISCONTINUED | OUTPATIENT
Start: 2022-08-25 | End: 2022-08-25

## 2022-08-25 RX ORDER — BUPIVACAINE HYDROCHLORIDE 2.5 MG/ML
INJECTION, SOLUTION EPIDURAL; INFILTRATION; INTRACAUDAL PRN
Status: DISCONTINUED | OUTPATIENT
Start: 2022-08-25 | End: 2022-08-25 | Stop reason: SDUPTHER

## 2022-08-25 RX ORDER — SODIUM CHLORIDE 0.9 % (FLUSH) 0.9 %
5-40 SYRINGE (ML) INJECTION EVERY 12 HOURS SCHEDULED
Status: DISCONTINUED | OUTPATIENT
Start: 2022-08-25 | End: 2022-08-26 | Stop reason: HOSPADM

## 2022-08-25 RX ORDER — MISOPROSTOL 200 UG/1
800 TABLET ORAL PRN
Status: DISCONTINUED | OUTPATIENT
Start: 2022-08-25 | End: 2022-08-25

## 2022-08-25 RX ORDER — DOCUSATE SODIUM 100 MG/1
100 CAPSULE, LIQUID FILLED ORAL 2 TIMES DAILY
Status: DISCONTINUED | OUTPATIENT
Start: 2022-08-25 | End: 2022-08-26 | Stop reason: HOSPADM

## 2022-08-25 RX ORDER — SODIUM CHLORIDE, SODIUM LACTATE, POTASSIUM CHLORIDE, AND CALCIUM CHLORIDE .6; .31; .03; .02 G/100ML; G/100ML; G/100ML; G/100ML
500 INJECTION, SOLUTION INTRAVENOUS PRN
Status: DISCONTINUED | OUTPATIENT
Start: 2022-08-25 | End: 2022-08-25

## 2022-08-25 RX ORDER — NICOTINE 21 MG/24HR
1 PATCH, TRANSDERMAL 24 HOURS TRANSDERMAL DAILY PRN
Status: DISCONTINUED | OUTPATIENT
Start: 2022-08-25 | End: 2022-08-26 | Stop reason: HOSPADM

## 2022-08-25 RX ORDER — SODIUM CHLORIDE 9 MG/ML
25 INJECTION, SOLUTION INTRAVENOUS PRN
Status: DISCONTINUED | OUTPATIENT
Start: 2022-08-25 | End: 2022-08-25

## 2022-08-25 RX ORDER — SODIUM CHLORIDE, SODIUM LACTATE, POTASSIUM CHLORIDE, CALCIUM CHLORIDE 600; 310; 30; 20 MG/100ML; MG/100ML; MG/100ML; MG/100ML
INJECTION, SOLUTION INTRAVENOUS CONTINUOUS
Status: DISCONTINUED | OUTPATIENT
Start: 2022-08-25 | End: 2022-08-25

## 2022-08-25 RX ORDER — NALOXONE HYDROCHLORIDE 0.4 MG/ML
INJECTION, SOLUTION INTRAMUSCULAR; INTRAVENOUS; SUBCUTANEOUS PRN
Status: DISCONTINUED | OUTPATIENT
Start: 2022-08-25 | End: 2022-08-25

## 2022-08-25 RX ORDER — IBUPROFEN 800 MG/1
800 TABLET ORAL EVERY 8 HOURS PRN
Status: DISCONTINUED | OUTPATIENT
Start: 2022-08-25 | End: 2022-08-26 | Stop reason: HOSPADM

## 2022-08-25 RX ORDER — SODIUM CHLORIDE 0.9 % (FLUSH) 0.9 %
5-40 SYRINGE (ML) INJECTION PRN
Status: DISCONTINUED | OUTPATIENT
Start: 2022-08-25 | End: 2022-08-26 | Stop reason: HOSPADM

## 2022-08-25 RX ORDER — LIDOCAINE HYDROCHLORIDE AND EPINEPHRINE 15; 5 MG/ML; UG/ML
INJECTION, SOLUTION EPIDURAL PRN
Status: DISCONTINUED | OUTPATIENT
Start: 2022-08-25 | End: 2022-08-25 | Stop reason: SDUPTHER

## 2022-08-25 RX ORDER — DIPHENHYDRAMINE HYDROCHLORIDE 50 MG/ML
25 INJECTION INTRAMUSCULAR; INTRAVENOUS EVERY 6 HOURS PRN
Status: DISCONTINUED | OUTPATIENT
Start: 2022-08-25 | End: 2022-08-25

## 2022-08-25 RX ADMIN — BUPIVACAINE HYDROCHLORIDE 5 ML: 2.5 INJECTION, SOLUTION EPIDURAL; INFILTRATION; INTRACAUDAL at 08:49

## 2022-08-25 RX ADMIN — Medication 1 MILLI-UNITS/MIN: at 04:13

## 2022-08-25 RX ADMIN — BUPIVACAINE HYDROCHLORIDE 5 ML: 2.5 INJECTION, SOLUTION EPIDURAL; INFILTRATION; INTRACAUDAL at 08:54

## 2022-08-25 RX ADMIN — DOCUSATE SODIUM 100 MG: 100 CAPSULE, LIQUID FILLED ORAL at 20:03

## 2022-08-25 RX ADMIN — DOCUSATE SODIUM 100 MG: 100 CAPSULE, LIQUID FILLED ORAL at 12:20

## 2022-08-25 RX ADMIN — IBUPROFEN 800 MG: 800 TABLET, FILM COATED ORAL at 12:19

## 2022-08-25 RX ADMIN — Medication 87.3 MILLI-UNITS/MIN: at 11:03

## 2022-08-25 RX ADMIN — SODIUM CHLORIDE, POTASSIUM CHLORIDE, SODIUM LACTATE AND CALCIUM CHLORIDE 1000 ML: 600; 310; 30; 20 INJECTION, SOLUTION INTRAVENOUS at 08:35

## 2022-08-25 RX ADMIN — LIDOCAINE HYDROCHLORIDE AND EPINEPHRINE 3 ML: 15; 5 INJECTION, SOLUTION EPIDURAL at 08:46

## 2022-08-25 RX ADMIN — Medication 166.7 ML: at 11:03

## 2022-08-25 RX ADMIN — SODIUM CHLORIDE, POTASSIUM CHLORIDE, SODIUM LACTATE AND CALCIUM CHLORIDE: 600; 310; 30; 20 INJECTION, SOLUTION INTRAVENOUS at 04:12

## 2022-08-25 RX ADMIN — IBUPROFEN 800 MG: 800 TABLET, FILM COATED ORAL at 20:03

## 2022-08-25 ASSESSMENT — PAIN DESCRIPTION - DESCRIPTORS
DESCRIPTORS: DISCOMFORT;CRAMPING
DESCRIPTORS: DISCOMFORT;PRESSURE
DESCRIPTORS: CRAMPING
DESCRIPTORS: DISCOMFORT;PRESSURE
DESCRIPTORS: CRAMPING

## 2022-08-25 ASSESSMENT — PAIN - FUNCTIONAL ASSESSMENT
PAIN_FUNCTIONAL_ASSESSMENT: ACTIVITIES ARE NOT PREVENTED
PAIN_FUNCTIONAL_ASSESSMENT: ACTIVITIES ARE NOT PREVENTED

## 2022-08-25 ASSESSMENT — PAIN SCALES - GENERAL
PAINLEVEL_OUTOF10: 1
PAINLEVEL_OUTOF10: 4

## 2022-08-25 ASSESSMENT — PAIN DESCRIPTION - ORIENTATION
ORIENTATION: LOWER
ORIENTATION: LOWER

## 2022-08-25 ASSESSMENT — PAIN DESCRIPTION - LOCATION
LOCATION: ABDOMEN
LOCATION: ABDOMEN

## 2022-08-25 NOTE — FLOWSHEET NOTE
Transfer of care report given to Quynh Fonseca RN, [employer] updated and call light in reach.

## 2022-08-25 NOTE — L&D DELIVERY NOTE
Department of Obstetrics and Gynecology  Induced Vaginal Delivery Note    Dale Hudson ENPBDD,[id number]    PRENATAL CARE: Complicated by: none    Pre-operative Diagnosis:  Term pregnancy, Induced labor, Single fetus, and active labor      Post-operative Diagnosis: the same    Type of induction: pitocin    Additional Procedures: none     Information for the patient's :  Jhon Barksdale [[id number]]                  Infant Wt:   Information for the patient's :  Jhon Barksdale [[id number]]          APGARS:     Information for the patient's :  Jhon Barksdale [[id number]]         Anesthesia:  epidural anesthesia    Specimen:  Placenta sent to pathology No    Estimated blood loss: 100 cc     Condition: mother and infant stable    Infant Feeding: breast    Delivery Summary: Patient is Adam Villanueva  is a 34 y.o.  female at 39w1d admitted to Labor and Delivery room for induction of labor and placed on external monitors. After FHT were confirmed to be reassuring the induction proceeded with pitocin by protocol. Contractions were regular, FHT reactive with moderate variability without decels. Pt did received epidural anesthesia. Progress of labor as anticipated and now fully dilated cervix. With subsequent contractions she started pushing and delivered vaginally spontaneously with no complications. 10 Units of Pitocin in 500 ml of LR was started IV. Placenta, cord and membranes were delivered spontaneously within less than 15 minutes. Uterus was not explored. Vaginal walls, cervix, perineum, rectum were intact on inspection. Uterus was well contracted. Vaginal sweep was done, laps count was correct. Mother and  left stable to recovery.      Electronically signed by Ethan Zapien MD on 2022 at 11:19 AM

## 2022-08-25 NOTE — ANESTHESIA POSTPROCEDURE EVALUATION
Department of Anesthesiology  Postprocedure Note    Patient: Courtney Burr  MRN: [id number]  YOB: 1990  Date of evaluation: 8/25/2022      Procedure Summary     Date: 08/25/22 Room / Location:     Anesthesia Start: 9175 Anesthesia Stop: 2462    Procedure: Labor Analgesia Diagnosis:     Scheduled Providers:  Responsible Provider:     Anesthesia Type: epidural ASA Status: 2          Anesthesia Type: epidural    Robert Phase I:      Robert Phase II:        Anesthesia Post Evaluation    Patient location during evaluation: floor  Patient participation: complete - patient participated  Level of consciousness: awake and alert  Pain score: 0  Airway patency: patent  Nausea & Vomiting: no vomiting and no nausea  Complications: no  Cardiovascular status: blood pressure returned to baseline and hemodynamically stable  Respiratory status: acceptable and room air  Hydration status: stable

## 2022-08-25 NOTE — LACTATION NOTE
This note was copied from a baby's chart. Lactation Progress Note  Initial Consult    Data: Referral received per RN. Action: LC to room. Mother states agreeable to consult from [facility] at this time. I reviewed Care Plan for First 24 Hours of Life already in patient binder. Discussed recognizing hunger cues and offering the breast when cues are shown. Encouraged breastfeeding on demand and attempting/offering at least every 3 hours. Informed infant may have one 5 hour stretch of sleep in a 24 hour period. Encouraged unlimited skin to skin contact with infant and reviewed benefits including better temperature, heart rate, respiration, blood pressure, and blood sugar regulation. Also increased bonding and milk supply associated with skin to skin contact. Discussed feeding positions, latch on techniques, signs of milk transfer, output goals and normal feeding/sleeping behaviors. I referred mother to binder for additional information about breastfeeding and skin to skin contact. Discussed hand expression with mother and encouraged her to practice getting drops to infant today. Reinforced importance of positioning infant nose to nipple, belly to belly, waiting for wide open mouth, and bringing baby onto breast to ensure a deep latch. Discussed importance of obtaining deep latch to ensure proper milk transfer, milk production and supply and maternal comfort. Mother has breastfeeding hx of 5 months with previous child (now 8 years). Mother states no complications with breastfeeding that child. Mother already has a new breast pump for home use. Gave breastfeeding booklet along with additional resources for after discharge. I wrote my name and circled the phone number on patient's whiteboard, provided a lactation consultant business card, directed mother to [facility] for evidence based information, and encouraged mother to call with any lactation needs. Response:   Mother verbalizes understanding of information given and denies further needs at this time.

## 2022-08-25 NOTE — FLOWSHEET NOTE
Pt sitting up eating. Recovery over. Fundus firm at U/U, will transfer pt to 2255 when finished eating and legs fully back. Pt breast feed infant 45min on R and 10 min on left.

## 2022-08-25 NOTE — FLOWSHEET NOTE
Dr James Prater at bedside and [facility] reviewed. SVE with AROM clear fluid noted. Pt 3cm 50% -3. Kiesha care given.

## 2022-08-25 NOTE — FLOWSHEET NOTE
RN/MD remained at bedside throughout pushing. EFM continuously assessed. Vaginal delivery of viable girl at 1058. Infant crying and pinking and placed skin to skin with mother. Mother bonding well with infant.

## 2022-08-25 NOTE — ANESTHESIA PRE PROCEDURE
Department of Anesthesiology  Preprocedure Note       Name:  Adam Villanueva   Age:  34 y.o.  :  1990                                          MRN:  [id number]         Date:  2022      Surgeon: * No surgeons listed *    Procedure: * No procedures listed *    Medications prior to admission:   Prior to Admission medications    Medication Sig Start Date End Date Taking? Authorizing Provider   Prenatal MV-Min-Fe Fum-FA-DHA (PRENATAL 1 PO) Take by mouth   Yes Historical Provider, MD   Misc. Devices KIT Nature's Blend Prenatal Multivitamin with Minerals [facility] Baby Box)  Jomar Aguilar 47: [address]-68; Take 1 softgel by mouth daily or as instructed by provider;  #qs for 6 months 22   Krupa Carney MD   amphetamine-dextroamphetamine (ADDERALL XR) 15 MG extended release capsule Take 1 capsule by mouth daily for 30 days. 21  OMI Collins CNP   amphetamine-dextroamphetamine (ADDERALL XR) 15 MG extended release capsule Take 1 capsule by mouth daily for 30 days. 10/24/21 11/23/21  OMI Collins CNP   UBRELVY 50 MG TABS TAKE 1 TABLET BY MOUTH AS NEEDED FOR MIGRAINE. MAY REPEAT IN 2 HOURS. Patient not taking: Reported on 2022   OMI Collins CNP   SUMAtriptan (IMITREX) 100 MG tablet TAKE 1 TABLET BY MOUTH AT ONSET OF HEADACHE. MAY REPEAT DOSE IN 2 HOURS IF NO RELIEF.  DO NOT EXCEED 2 TABLETS IN 24 HOURS  Patient not taking: Reported on 20   OMI Collins CNP   vitamin D (ERGOCALCIFEROL) 1.25 MG (44680 UT) CAPS capsule Take 1 capsule by mouth once a week  Patient not taking: Reported on 2022 3/5/20   OMI Collins CNP   famotidine (PEPCID AC) 10 MG tablet Take 10 mg by mouth 2 times daily  Patient not taking: Reported on 2022    Historical Provider, MD BEASLEY LO 0.1-0.02 & 0.01 MG TABS TK 1 T PO QD  Patient not taking: Reported on 2022 3/5/18   Historical Provider, MD edwardsirizine (ZYRTEC ALLERGY) 10 MG tablet Take 1 tablet by mouth daily as needed for Allergies (hives) 9/27/16   Tee Lubin MD       Current medications:    Current Facility-Administered Medications   Medication Dose Route Frequency Provider Last Rate Last Admin    lactated ringers infusion   IntraVENous Continuous Braydon Eden  mL/hr at 08/25/22 0744 Rate Verify at 08/25/22 0744    lactated ringers bolus  500 mL IntraVENous PRN Braydon Eden MD        Or    lactated ringers bolus  1,000 mL IntraVENous PRN Braydon Eden .9 mL/hr at 08/25/22 0835 1,000 mL at 08/25/22 0835    sodium chloride flush 0.9 % injection 5-40 mL  5-40 mL IntraVENous 2 times per day Braydon Eden MD        sodium chloride flush 0.9 % injection 5-40 mL  5-40 mL IntraVENous PRN Braydon Eden MD        0.9 % sodium chloride infusion  25 mL IntraVENous PRN Braydon Eden MD        methylergonovine (METHERGINE) injection 200 mcg  200 mcg IntraMUSCular PRN Braydon Eden MD        carboprost [facility]) injection 250 mcg  250 mcg IntraMUSCular PRN Braydon Eden MD        miSOPROStol (CYTOTEC) tablet 800 mcg  800 mcg Rectal PRN Braydon Eden MD        oxytocin (PITOCIN) 30 units in 500 mL infusion  87.3 jose-units/min IntraVENous Continuous EVI Eden MD        And    oxytocin (PITOCIN) 10 unit bolus from the bag  10 Units IntraVENous MD Beatrice Coppola [facility]) injection 4 mg  4 mg IntraVENous Q6H PRN Braydon Eden MD        oxytocin (PITOCIN) 30 units in 500 mL infusion  1-20 jose-units/min IntraVENous Continuous Braydon Eden MD 8 mL/hr at 08/25/22 0730 8 jose-units/min at 08/25/22 0730    naloxone 0.4 mg in 10 mL sodium chloride syringe   IntraVENous PRN MD Beatrice Greenberg [facility]) injection 4 mg  4 mg IntraVENous Q6H PRN Collin Breath, MD        fentaNYL 3 mcg/ml bupivacaine 0.125% in sodium chloride 0.9% 100 mL epidural  12 mL/hr Epidural Continuous Fidelia Paz MD 12 mL/hr at 08/25/22 0908 12 mL/hr at 08/25/22 0908    nalbuphine (NUBAIN) injection 5 mg  5 mg IntraVENous Q4H PRN Fidelia Paz MD        diphenhydrAMINE (BENADRYL) injection 25 mg  25 mg IntraVENous Q6H PRN Fidelia Paz MD         Facility-Administered Medications Ordered in Other Encounters   Medication Dose Route Frequency Provider Last Rate Last Admin    bupivacaine (PF) (MARCAINE) 0.25 % injection   Epidural PRN Ty Pesa, APRN - CRNA   5 mL at 08/25/22 0854    Lidocaine-EPINEPHrine 1.5 %-1:713027   Epidural PRN Ty Pesa, APRN - CRNA   3 mL at 08/25/22 0846       Allergies:  No Known Allergies    Problem List:    Patient Active Problem List   Diagnosis Code    Migraine G43.909    Reactive depression F32.9    Abnormal ECG R94.31    Family history of breast cancer Z80.3    Family history of ovarian cancer Z80.41    Normal labor O80, Z37.9       Past Medical History:        Diagnosis Date    Abnormal Pap smear of cervix     Chronic idiopathic urticaria     Depressive disorder, not elsewhere classified     Headache(784.0)     Migraine, unspecified, without mention of intractable migraine without mention of status migrainosus        Past Surgical History:        Procedure Laterality Date    MOUTH SURGERY  2003    MRI BREAST BX USING DEVICE RIGHT Right 11/29/2021    MRI BREAST BX USING DEVICE RIGHT 11/29/2021 [facility] EG MRI    OTHER SURGICAL HISTORY  2008/2014    2 Vaginal births       Social History:    Social History     Tobacco Use    Smoking status: Former    Smokeless tobacco: Never   Substance Use Topics    Alcohol use: Not Currently     Comment: occasionally                                Counseling given: Not Answered      Vital Signs (Current):   Vitals:    08/25/22 0902 08/25/22 0904 08/25/22 0906 08/25/22 0909   BP: 107/63 111/64 (!) 105/56    Pulse: 93 80 69    Resp:       Temp:       TempSrc:       SpO2:  98%  99%   Weight:       Height:                                                  BP Readings from Last 3 Encounters:   08/25/22 (!) 105/56   04/28/22 118/72   10/25/21 110/82       NPO Status:  clears with labor                                                                               BMI:   Wt Readings from Last 3 Encounters:   08/25/22 178 lb (80.7 kg)   04/28/22 173 lb 9.6 oz (78.7 kg)   10/25/21 163 lb (73.9 kg)     Body mass index is 30.55 kg/m². CBC:   Lab Results   Component Value Date/Time    WBC 6.6 08/25/2022 03:54 AM    RBC 3.36 08/25/2022 03:54 AM    HGB 11.1 08/25/2022 03:54 AM    HCT 31.9 08/25/2022 03:54 AM    MCV 95.1 08/25/2022 03:54 AM    RDW 12.7 08/25/2022 03:54 AM     08/25/2022 03:54 AM     01/30/2014 12:00 AM       CMP:   Lab Results   Component Value Date/Time     03/04/2020 08:56 AM    K 4.5 03/04/2020 08:56 AM     03/04/2020 08:56 AM    CO2 23 03/04/2020 08:56 AM    BUN 14 03/04/2020 08:56 AM    CREATININE 0.8 03/04/2020 08:56 AM    GFRAA >60 03/04/2020 08:56 AM    GFRAA >60 12/16/2011 09:48 AM    AGRATIO 1.6 06/22/2018 10:58 AM    LABGLOM >60 03/04/2020 08:56 AM    GLUCOSE 82 08/05/2021 08:25 AM    PROT 6.8 06/22/2018 10:58 AM    PROT 6.9 12/16/2011 09:48 AM    CALCIUM 9.5 03/04/2020 08:56 AM    BILITOT 0.5 06/22/2018 10:58 AM    ALKPHOS 39 06/22/2018 10:58 AM    AST 37 06/22/2018 10:58 AM    ALT 21 06/22/2018 10:58 AM       POC Tests: No results for input(s): POCGLU, POCNA, POCK, POCCL, POCBUN, POCHEMO, POCHCT in the last 72 hours.     Coags: No results found for: PROTIME, INR, APTT    HCG (If Applicable):   Lab Results   Component Value Date    PREGTESTUR Negative 02/22/2017        ABGs: No results found for: PHART, PO2ART, YRK8KAO, LIY7JOH, BEART, J2NMTBSL     Type & Screen (If Applicable):  No results found for: LABABO, LABRH    Drug/Infectious Status (If Applicable):  Lab Results   Component Value Date/Time    HEPCAB Non-Reactive (Negative) 03/11/2013 10:52 AM       COVID-19 Screening (If Applicable): No results found for: COVID19        Anesthesia Evaluation  Patient summary reviewed no history of anesthetic complications:   Airway: Mallampati: I  TM distance: >3 FB   Neck ROM: full  Mouth opening: > = 3 FB   Dental: normal exam         Pulmonary:Negative Pulmonary ROS and normal exam                               Cardiovascular:Negative CV ROS  Exercise tolerance: good (>4 METS),           Rhythm: regular  Rate: normal           Beta Blocker:  Not on Beta Blocker         Neuro/Psych:   (+) headaches: migraine headaches, psychiatric history: stable with treatment            GI/Hepatic/Renal: Neg GI/Hepatic/Renal ROS            Endo/Other: Negative Endo/Other ROS                    Abdominal:             Vascular: negative vascular ROS. Other Findings:           Anesthesia Plan      epidural     ASA 2             Anesthetic plan and risks discussed with patient. Use of blood products discussed with patient whom consented to blood products.                      OMI Damian - CRNA   8/25/2022

## 2022-08-25 NOTE — FLOWSHEET NOTE
Pt to OB labor room for scheduled pitocin induction of labor. Pt and spouse orient to room, call light in reach, plan of care discussed. Pt placed on monitor, viewed at central bank. Initial assessment and admission completed. IV placed, labs drawn, LR infusing. Consents reviewed and signed. Questions answered.

## 2022-08-25 NOTE — FLOWSHEET NOTE
Patient actively pushing. RN/MD remains in continuous attendance at the bedside. Assessment & evaluation of fetal heart rate ongoing via continuous EFM.

## 2022-08-25 NOTE — FLOWSHEET NOTE
08/25/22 0400   Fetal Heart Rate   Mode External US   Baseline Rate 130 bpm   Baseline Classification Normal   Variability 6-25 BPM   Pattern Accelerations   Patient Feels Fetal Movement Yes   Interventions RN at Bedside; [place] Adjusted;Ultrasound Adjusted   OB Bladder Status Non-distended   OB Bladder Intervention Voiding with Relief   Fetal Monitoring Strip   FMS Reviewed?  Yes   FMS Reviewed By? cs   Uterine Activity   UA Mode P[place]os;Palpation   Contractions Occasional   Contraction Frequency x1   Contraction Duration 80   Contraction Intensity Mild   Resting Tone Palpated Soft

## 2022-08-25 NOTE — ANESTHESIA PROCEDURE NOTES
Epidural Block    Patient location during procedure: OB  Start time: 8/25/2022 8:35 AM  End time: 8/25/2022 9:05 AM  Reason for block: labor epidural  Staffing  Performed: resident/CRNA   Anesthesiologist: Miller Aparicio MD  Resident/CRNA: OMI Duarte CRNA  Epidural  Patient position: sitting  Prep: ChloraPrep and site prepped and draped  Patient monitoring: continuous pulse ox and frequent blood pressure checks  Approach: midline  Location: L3-4  Injection technique: JUAN saline  Provider prep: mask and sterile gloves  Needle  Needle type: Tuohy   Needle gauge: 17 G  Needle length: 3.5 in  Needle insertion depth: 6 cm  Catheter type: side hole  Catheter size: 19 G  Catheter at skin depth: 12 cm  Test dose: negativeCatheter Secured: tegaderm  Assessment  Sensory level: T6  Hemodynamics: stable  Attempts: 1  Outcomes: uncomplicated  Additional Notes  DPE technique  Preanesthetic Checklist  Completed: patient identified, IV checked, site marked, risks and benefits discussed, surgical/procedural consents, equipment checked, pre-op evaluation, timeout performed, anesthesia consent given, oxygen available, monitors applied/VS acknowledged, fire risk safety assessment completed and verbalized and blood product R/B/A discussed and consented

## 2022-08-25 NOTE — FLOWSHEET NOTE
Pt called out c/o increased pressure. SVE pt complete. Room already set up for delivery. Dr [place] called and on way to room.

## 2022-08-25 NOTE — H&P
Department of Obstetrics and Gynecology   Obstetrics History and Physical        CHIEF COMPLAINT:  induction    HISTORY OF PRESENT ILLNESS:    Riya Lopez  is a 34 y.o.  female at 36w3d presents with a chief complaint as above and is being admitted for induction    Estimated Due Date: Estimated Date of Delivery: 22    PRENATAL CARE: Complicated by: none    PAST OB HISTORY:  OB History          3    Para   1    Term   1            AB        Living   2         SAB        IAB        Ectopic        Molar        Multiple        Live Births   2              Past Medical History:        Diagnosis Date    Abnormal Pap smear of cervix     Chronic idiopathic urticaria     Depressive disorder, not elsewhere classified     Headache(784.0)     Migraine, unspecified, without mention of intractable migraine without mention of status migrainosus      Past Surgical History:        Procedure Laterality Date    MOUTH SURGERY      MRI BREAST BX USING DEVICE RIGHT Right 2021    MRI BREAST BX USING DEVICE RIGHT 2021 [address] EG MRI    OTHER SURGICAL HISTORY      2 Vaginal births     Allergies:  Patient has no known allergies.   Social History:    Social History     Socioeconomic History    Marital status:      Spouse name: Chip Zarate    Number of children: 2    Years of education: Not on file    Highest education level: Not on file   Occupational History    Occupation: [employer]   Tobacco Use    Smoking status: Former    Smokeless tobacco: Never   Vaping Use    Vaping Use: Never used   Substance and Sexual Activity    Alcohol use: Not Currently     Comment: occasionally    Drug use: No    Sexual activity: Yes     Birth control/protection: Other-see comments   Other Topics Concern    Not on file   Social History Narrative    Not on file     Social Determinants of Health     Financial Resource Strain: Low Risk     Difficulty of Paying Living Expenses: Not hard at all Food Insecurity: No Food Insecurity    Worried About Running Out of Food in the Last Year: Never true    Ran Out of Food in the Last Year: Never true   Transportation Needs: Not on file   Physical Activity: Not on file   Stress: Not on file   Social Connections: Not on file   Intimate Partner Violence: Not on file   Housing Stability: Not on file     Family History:       Problem Relation Age of Onset    No Known Problems Mother     Other Father         Pulmonary Embolism    High Cholesterol Father     Other Sister         Addiciont    Breast Cancer Maternal Grandmother         70's    Ovarian Cancer Maternal Grandmother         63's    Breast Cancer Paternal Grandmother         early 66's     Medications Prior to Admission:  Medications Prior to Admission: Prenatal MV-Min-Fe Fum-FA-DHA (PRENATAL 1 PO), Take by mouth  Misc. Devices KIT, Nature's Blend Prenatal Multivitamin with Minerals [facility] Baby Box)  Jomar Aguilar 47: [id number]; Take 1 softgel by mouth daily or as instructed by provider;  #qs for 6 months  amphetamine-dextroamphetamine (ADDERALL XR) 15 MG extended release capsule, Take 1 capsule by mouth daily for 30 days. amphetamine-dextroamphetamine (ADDERALL XR) 15 MG extended release capsule, Take 1 capsule by mouth daily for 30 days. UBRELVY 50 MG TABS, TAKE 1 TABLET BY MOUTH AS NEEDED FOR MIGRAINE. MAY REPEAT IN 2 HOURS. (Patient not taking: Reported on 4/28/2022)  SUMAtriptan (IMITREX) 100 MG tablet, TAKE 1 TABLET BY MOUTH AT ONSET OF HEADACHE. MAY REPEAT DOSE IN 2 HOURS IF NO RELIEF.  DO NOT EXCEED 2 TABLETS IN 24 HOURS (Patient not taking: Reported on 4/28/2022)  vitamin D (ERGOCALCIFEROL) 1.25 MG (80118 UT) CAPS capsule, Take 1 capsule by mouth once a week (Patient not taking: Reported on 4/28/2022)  famotidine (PEPCID AC) 10 MG tablet, Take 10 mg by mouth 2 times daily (Patient not taking: Reported on 4/28/2022)  AMETHIA LO 0.1-0.02 & 0.01 MG TABS, TK 1 T PO QD (Patient not taking: Reported on 2022)  cetirizine (ZYRTEC ALLERGY) 10 MG tablet, Take 1 tablet by mouth daily as needed for Allergies (hives)    REVIEW OF SYSTEMS:  negative     PHYSICAL EXAM:    Vitals:    22 0318 22 0622 22 0715 22 0730   BP: 122/79 118/74 100/65 106/71   Pulse: 81 78 64 76   Resp: 18 16 16 16   Temp: 98.5 °F (36.9 °C)  97.8 °F (36.6 °C)    TempSrc: Oral  Oral    SpO2: 99%  99%    Weight: 178 lb (80.7 kg)      Height: 5' 4\" (1.626 m)        General appearance:  awake, alert, cooperative, no apparent distress, and appears stated age  Neurologic:  Awake, alert, oriented to name, place and time. Lungs:  No increased work of breathing, good air exchange  Abdomen:  Soft, non tender, gravid, fundal height consistent with the gestational age, EFW by Leopold's maneuvers is AGA  Pelvis:  Adequate pelvis  Cervix: 3/50/-3  Contraction frequency: irregular  Membranes:  intact  Labs: CBC:   Lab Results   Component Value Date/Time    WBC 6.6 2022 03:54 AM    RBC 3.36 2022 03:54 AM    HGB 11.1 2022 03:54 AM    HCT 31.9 2022 03:54 AM    MCV 95.1 2022 03:54 AM    MCH 33.1 2022 03:54 AM    MCHC 34.8 2022 03:54 AM    RDW 12.7 2022 03:54 AM     2022 03:54 AM     2014 12:00 AM    MPV 9.8 2022 03:54 AM       ASSESSMENT:  34 y.o.  at 39w1d  Elective IOL    PLAN: pitocin, Admit  Labor: Routine labor orders  Fetus: Reassuring  GBS: Negative    I have presented reasonable alternatives to the patient's proposed care, treatment, and services. The discussion I have done encompassed risks, benefits, and side effects related to the alternatives and the risks related to not receiving the proposed care, treatment, and services. All questions answered. Patient wishes to proceed.      Electronically signed by Ramona Lemon MD on 2022 at 7:46 AM

## 2022-08-25 NOTE — FLOWSHEET NOTE
Bedside report received and RN took over pt care. VS stable and pt afebrile. OB folder reviewed along with plan of care. Pt rates cx pain a 2/10. White board updated and calllight in reach. Pitocin dose verified.

## 2022-08-25 NOTE — FLOWSHEET NOTE
Pt admitted to recovery period. Fundus firm at U/U, small rubra noted. Ice placed to pts bottom. Parents bonding well with infant. Pitocin infusing per protocol. Pt with no c/o pain at this time.

## 2022-08-25 NOTE — FLOWSHEET NOTE
Report received from PAIGE Peters RN. Patient sitting up in bed holding infant. Whiteboard updated, plan of care for the night discussed, call light within reach. No questions at this time.

## 2022-08-25 NOTE — FLOWSHEET NOTE
Noted variable decls. Pt turned from L to R side. Pt verbalized pain at a 5/10. Pt requested epidural. IVF bolus started. Jacinda Arlington called to place epi.

## 2022-08-25 NOTE — PLAN OF CARE
Pt up to BR to void and voided 400cc. Kiesha care given. Epi cath d/kerry tip noted. Pt to w/c and transferred to [phone] with infant in mother arms. Gait steady. VS stable and no signs of infection noted. Pt has remained free of falls since admission. Pt aware of d/c paper work needed for d/c home. Pt plans on d/c home in am. Pt with no c/o pain at this time. White board updated and call light in reach. Postpartum goals reviewed.

## 2022-08-26 VITALS
HEART RATE: 56 BPM | TEMPERATURE: 97.7 F | RESPIRATION RATE: 16 BRPM | DIASTOLIC BLOOD PRESSURE: 63 MMHG | OXYGEN SATURATION: 98 % | SYSTOLIC BLOOD PRESSURE: 106 MMHG | BODY MASS INDEX: 30.39 KG/M2 | HEIGHT: 64 IN | WEIGHT: 178 LBS

## 2022-08-26 PROCEDURE — 6370000000 HC RX 637 (ALT 250 FOR IP): Performed by: OBSTETRICS & GYNECOLOGY

## 2022-08-26 RX ORDER — IBUPROFEN 800 MG/1
800 TABLET ORAL EVERY 8 HOURS PRN
Qty: 40 TABLET | Refills: 1 | Status: SHIPPED | OUTPATIENT
Start: 2022-08-26

## 2022-08-26 RX ORDER — PSEUDOEPHEDRINE HCL 30 MG
100 TABLET ORAL 2 TIMES DAILY PRN
Qty: 60 CAPSULE | Refills: 1 | Status: SHIPPED | OUTPATIENT
Start: 2022-08-26

## 2022-08-26 RX ADMIN — DOCUSATE SODIUM 100 MG: 100 CAPSULE, LIQUID FILLED ORAL at 08:16

## 2022-08-26 RX ADMIN — IBUPROFEN 800 MG: 800 TABLET, FILM COATED ORAL at 04:14

## 2022-08-26 RX ADMIN — ACETAMINOPHEN 1000 MG: 500 TABLET ORAL at 08:16

## 2022-08-26 ASSESSMENT — PAIN DESCRIPTION - DESCRIPTORS
DESCRIPTORS: CRAMPING
DESCRIPTORS: CRAMPING

## 2022-08-26 ASSESSMENT — PAIN SCALES - GENERAL
PAINLEVEL_OUTOF10: 3
PAINLEVEL_OUTOF10: 2

## 2022-08-26 ASSESSMENT — PAIN DESCRIPTION - ORIENTATION
ORIENTATION: LOWER
ORIENTATION: LOWER

## 2022-08-26 ASSESSMENT — PAIN DESCRIPTION - LOCATION
LOCATION: ABDOMEN
LOCATION: ABDOMEN

## 2022-08-26 NOTE — DISCHARGE INSTRUCTIONS
Thank you for the opportunity to care for you and your family. We hope we always exceeded your expectations and provided very good care during your stay in the [facility]. We want to ensure that you have the help you need when you leave the hospital. If there is anything we can assist you with please let us know. Please call and schedule an appointment to be seen by your obstetrician as scheduled. You will need to call and make your own appointment. For breastfeeding moms, you can contact our lactation consultants with any problems or questions. Please call [phone] for questions. Diet  Eat a well balanced diet focusing on foods high in fiber and protein. Drink plenty of fluids, especially water. To avoid constipation you may take a mild stool softener as recommended by your doctor or midwife. Activity  Gradually increase your activity. Resume exercise only after advise by your doctor or midwife. Avoid lifting anything heavier than your baby for 2 weeks. Avoid driving for 5-7 days or when not taking narcotics. Rise slowly from a lying to sitting and then a standing position. Climb stairs one at a time. Use caution when carrying your baby up and down the stairs. NO SEXUAL activity for 6 weeks or until advised by your doctor. Nothing in the vagina. No tampons, no douches and no intercourse. Be prepared to discuss family planning at your follow-up OB visit. You may feel tired or have a lack of energy. You may continue your prenatal vitamin to replenish nutrients post delivery. Nap when your baby naps to catch up on sleep. EMOTIONS  You may feel mora, sad, teary and/or overwhelmed. Contact your OB provider if you feel you may be showing signs of postpartum depression or have thoughts of harming yourself or your baby. If infant will not stop crying, contact another adult for help. Place the infant in his/her crib and step away for a break.   NEVER shake your baby.      BLEEDING  Vaginal bleeding will decrease in amount over the next few weeks. You will notice that as your activity increases, you flow may increase. This is your body's way of telling you to \"take it easy\" and rest.  Call your OB if you are saturating more than one maxi pad in an hour for 2 hours and resting does not help. Also call if your bleeding has a foul odor or you are passing clots larger than a lemon on several occasions. BREAST CARE  Take pain medications as needed and as prescribed by your [facility] provider for pain. If you develop a warm, red, tender area on your breast or develop a fever contact your OB provider. For breastfeeding mothers: If you become engorged, feeding may be more difficult or painful for 1-2 days. You may find it helpful to hand express some milk. Hand expressing may make it easier for your baby to latch. While breastfeeding, continue to take your prenatal vitamins as directed by your doctor or midwife. Refer to the breastfeeding booklet in the  folder/binder for more information. For any questions or concerns please contact a Lactation Consultant. Leave a message and your call will be returned. For NON-breastfeeding mothers: You may apply ice packs to your breasts over your bra for twenty minutes at a time for comfort. Avoid stimulation to your breast.  When showering allow the water to strike your back not your breasts. Wear a good fitting bra until your milk has come and gone. A sports bra is a good idea. EPISIOTOMY CARE    Episiotomy (allison-care): Use the allison-bottle after toileting, until your bleeding stops. Cleanse your perineum from front to back. If used, stitches will dissolve in 4-6 weeks. You may use a sitz bath or soak in a clean tub with antibacterial soap as needed for comfort. Kegel exercises will help restore bladder control. SWELLING  Try to keep your legs elevated when you are sitting.   When lying down, keep your legs elevated. When wearing stockings or socks, make sure they are not too tight. WHEN TO CALL THE DOCTOR  If you have a temperature of 100.6 or more. If your bleeding has increased and you are saturating a pad or more in 1 hour for 2 hours. Your abdomen is tender to the touch. You are passing blood clots bigger than the size of a lemon for several occasions. If you are experiencing extreme weakness or dizziness. If you are having flu-like symptoms: achy muscles or joints. If there is a foul-smell or green color to your vaginal bleeding. If you have pain that cannot be relieved. You have persistent burning with urination or frequency. Call if you have concerns about your well-being. You are unable to sleep, eat or are having thoughts of harming yourself or your baby. You have swelling, bleeding, drainage, foul odor, redness or warmth in/around your incision or stitches. You have a red, warm or tender area in your calf. Headache that rest and Tylenol does not relieve. Department of Obstetrics and Gynecology  Vaginal Delivery Postpartum Discharge instructions    You will need a postpartum visit in the clinic 6 weeks after your delivery. Please call office [phone] to schedule an appointment:      Please call the office or the OB/GYN on-call if after-hours for any of the followin) Fever - a temperature greater than 100.4  2) Uncontrolled pain  3) Uncontrolled bleeding (soaking more than 1 pad in an hour)  4) Foul-smelling discharge from the vagina    Do not place anything in the vagina - this includes tampons, douches or having sex - until after your 6 week postpartum visit to prevent infection.

## 2022-08-26 NOTE — PROGRESS NOTES
Department of Obstetrics and Gynecology  Vaginal Delivery Postpartum Rounds    SUBJECTIVE:  Pain is controlled with non-steroidal anti-inflammatory drugs. Her lochia is normal.    OBJECTIVE:  Vital Signs: /79   Pulse 68   Temp 97.4 °F (36.3 °C) (Oral)   Resp 16   Ht 5' 4\" (1.626 m)   Wt 178 lb (80.7 kg)   SpO2 99%   Breastfeeding Unknown   BMI 30.55 kg/m²   Appearance/Psychiatric: awake, alert, cooperative, no apparent distress, appears stated age  Constitutional: The patient is well nourished. Cardiovascular: She does not have edema. Respiratory: Respiratory effort is normal.  Gastrointestinal: Soft, non tender, uterine fundus is firm below umbilicus  Extremities: nontender to palpation    LABS / IMAGING:  CBC:   Lab Results   Component Value Date/Time    WBC 6.6 2022 03:54 AM    RBC 3.36 2022 03:54 AM    HGB 11.1 2022 03:54 AM    HCT 31.9 2022 03:54 AM    MCV 95.1 2022 03:54 AM    MCH 33.1 2022 03:54 AM    MCHC 34.8 2022 03:54 AM    RDW 12.7 2022 03:54 AM     2022 03:54 AM     2014 12:00 AM    MPV 9.8 2022 03:54 AM       ASSESSMENT:    Postpartum Day 1 s/p     PLAN:   1. Continue routine postpartum care   2. Discharge home on Postpartum Day 1  3. Return to office in 6 weeks   4.  Postpartum instructions reviewed and all patient's Questions answered    Electronically signed by Miriam Jamison MD on 2022 at 8:15 AM

## 2022-08-26 NOTE — PROGRESS NOTES
CLINICAL PHARMACY NOTE: MEDS TO BEDS    Total # of Prescriptions Filled: 2   The following medications were delivered to the patient:  Current Discharge Medication List        START taking these medications    Details   ibuprofen (ADVIL;MOTRIN) 800 MG tablet Take 1 tablet by mouth every 8 hours as needed for Pain  Qty: 40 tablet, Refills: 1      docusate sodium (COLACE, DULCOLAX) 100 MG CAPS Take 100 mg by mouth 2 times daily as needed for Constipation  Qty: 60 capsule, Refills: 1               Additional Documentation:

## 2022-08-26 NOTE — FLOWSHEET NOTE
Patient reports pain is staying managed well with Ibuprofen use. VSS. Patient remains free of falls since admission. Patient reports bleeding is small and allison-care is going well. No s/s of infection noted. Patient ambulating well. IV removed at patient request- catheter intact, dressing applied. Tolerated well. Patient aware of paperwork needed for d/c and would like to be discharged following 24hr testing.

## 2022-08-26 NOTE — PLAN OF CARE
Problem: Pain  Goal: Verbalizes/displays adequate comfort level or baseline comfort level  8/25/2022 2138 by Alia Carranza RN  Outcome: Progressing  Flowsheets (Taken 8/25/2022 2003)  Verbalizes/displays adequate comfort level or baseline comfort level:   Assess pain using appropriate pain scale   Encourage patient to monitor pain and request assistance  8/25/2022 1444 by Conchita Castellon RN  Outcome: Progressing  Flowsheets (Taken 8/25/2022 1400)  Verbalizes/displays adequate comfort level or baseline comfort level:   Encourage patient to monitor pain and request assistance   Assess pain using appropriate pain scale   Administer analgesics based on type and severity of pain and evaluate response   Implement non-pharmacological measures as appropriate and evaluate response     Problem: Infection - Adult  Goal: Absence of infection at discharge  8/25/2022 2138 by Alia Carranza RN  Outcome: Progressing  8/25/2022 1444 by Conchita Castellon RN  Outcome: Progressing  8/25/2022 1444 by Conchita Castellon RN  Outcome: Progressing  Goal: Absence of infection during hospitalization  8/25/2022 2138 by Alia Carranza RN  Outcome: Progressing  8/25/2022 1444 by Conchita Castellon RN  Outcome: Progressing  8/25/2022 1444 by Conchita Castellon RN  Outcome: Progressing     Problem: Safety - Adult  Goal: Free from fall injury  8/25/2022 2138 by Alia Carranza RN  Outcome: Progressing  8/25/2022 1444 by Conchita Castellon RN  Outcome: Progressing  8/25/2022 1444 by Conchita Castellon RN  Outcome: Progressing

## 2022-08-26 NOTE — PLAN OF CARE
Problem: Pain  Goal: Verbalizes/displays adequate comfort level or baseline comfort level  Outcome: Completed  Flowsheets  Taken 8/26/2022 0810 by Alisia Marquis RN  Verbalizes/displays adequate comfort level or baseline comfort level:   Encourage patient to monitor pain and request assistance   Assess pain using appropriate pain scale   Administer analgesics based on type and severity of pain and evaluate response   Implement non-pharmacological measures as appropriate and evaluate response   Consider cultural and social influences on pain and pain management  Taken 8/26/2022 0400 by Margarita Le RN  Verbalizes/displays adequate comfort level or baseline comfort level:   Assess pain using appropriate pain scale   Encourage patient to monitor pain and request assistance  Taken 8/26/2022 0000 by Margarita Le RN  Verbalizes/displays adequate comfort level or baseline comfort level:   Assess pain using appropriate pain scale   Encourage patient to monitor pain and request assistance     Problem: Infection - Adult  Goal: Absence of infection at discharge  Outcome: Completed  Goal: Absence of infection during hospitalization  Outcome: Completed     Problem: Safety - Adult  Goal: Free from fall injury  Outcome: Completed

## 2022-08-26 NOTE — DISCHARGE SUMMARY
Department of Obstetrics and Gynecology  Postpartum Discharge Summary      Admit Date: 8/25/2022    Admit Diagnosis: Normal labor [O80, Z37.9]    Discharge Date: 8/26/2022    Discharge Diagnoses: spontaneous vaginal delivery       Medication List        START taking these medications      docusate 100 MG Caps  Commonly known as: COLACE, DULCOLAX  Take 100 mg by mouth 2 times daily as needed for Constipation     ibuprofen 800 MG tablet  Commonly known as: ADVIL;MOTRIN  Take 1 tablet by mouth every 8 hours as needed for Pain            CONTINUE taking these medications      * amphetamine-dextroamphetamine 15 MG extended release capsule  Commonly known as: Adderall XR  Take 1 capsule by mouth daily for 30 days. * amphetamine-dextroamphetamine 15 MG extended release capsule  Commonly known as: Adderall XR  Take 1 capsule by mouth daily for 30 days. cetirizine 10 MG tablet  Commonly known as: ZyrTEC Allergy  Take 1 tablet by mouth daily as needed for Allergies (hives)     Misc. Devices Kit  Nature's Blend Prenatal Multivitamin with Minerals [facility] Baby Box)  Jomar Aguilar 47: [address]-31; Take 1 softgel by mouth daily or as instructed by provider;  #qs for 6 months     PRENATAL 1 PO           * This list has 2 medication(s) that are the same as other medications prescribed for you. Read the directions carefully, and ask your doctor or other care provider to review them with you.                 STOP taking these medications      Amethia Lo 0.1-0.02 & 0.01 MG Tabs  Generic drug: Levonorgest-Eth Estrad 91-Day     Pepcid AC 10 MG tablet  Generic drug: famotidine     SUMAtriptan 100 MG tablet  Commonly known as: IMITREX     Ubrelvy 50 MG Tabs  Generic drug: Ubrogepant     vitamin D 1.25 MG (69413 UT) Caps capsule  Commonly known as: ERGOCALCIFEROL               Where to Get Your Medications        These medications were sent to [address] [place], [place] [address] [facility] [address] [phone] - [phone]  [address] [facility] [address], [address] [facility]      Phone: [phone]   docusate 100 MG Caps  ibuprofen 800 MG tablet         Service: Obstetrics    Consults: None    Significant Diagnostic Studies: none    Postpartum complications: none     Condition at Discharge[place] [facility]: uncomplicated    Discharge Instructions: Activity: as tolerated    Diet: regular diet    Instructions: No intercourse and nothing in the vagina for 6 weeks. Do not drive while using pain medications.  Keep any wounds clean and dry    Discharge to: Home    Disposition / Follow up: Return to office in 6 weeks    Home Health Nurse visit within 24-48 h if qualifies     Data:  Weight   Information for the patient's :  Panda Patrick [[id number]]      [address] for the patient's :  Panda Patrick [[id number]]       Home with mother    Electronically signed by Brittny Layton MD on 2022 at 8:41 AM

## 2022-08-26 NOTE — FLOWSHEET NOTE
Postpartum and infant care teaching completed and forms signed by patient. Copy witnessed by RN and given to patient. Patient verbalized understanding of all teaching points. Prescriptions given by retail pharmacy. Patient plans to follow-up with [facility] Provider as instructed. Patient verbalizes understanding of discharge instructions and denies further questions. ID bands checked. Mother's ID band and one of baby's ID bands removed and taped to footprint sheet, signed by patient and witnessed by RN. Patient discharged in stable condition accompanied by family. Discharged in wheelchair, holding baby in car seat.

## 2022-08-26 NOTE — FLOWSHEET NOTE
Assessments and vital signs completed, documented in flowsheets. All findings WNL. Plan of care for the day discussed with MOB. MOB verbalized understanding and had no further questions. Tylenol given at 0816 for cramping pain rating 3/10, will follow up with pain reassessment.
